# Patient Record
Sex: FEMALE | Race: WHITE | NOT HISPANIC OR LATINO | Employment: OTHER | URBAN - METROPOLITAN AREA
[De-identification: names, ages, dates, MRNs, and addresses within clinical notes are randomized per-mention and may not be internally consistent; named-entity substitution may affect disease eponyms.]

---

## 2022-11-05 ENCOUNTER — OFFICE VISIT (OUTPATIENT)
Dept: URGENT CARE | Facility: CLINIC | Age: 67
End: 2022-11-05
Payer: MEDICARE

## 2022-11-05 VITALS
BODY MASS INDEX: 36.1 KG/M2 | OXYGEN SATURATION: 98 % | SYSTOLIC BLOOD PRESSURE: 155 MMHG | RESPIRATION RATE: 20 BRPM | HEART RATE: 63 BPM | TEMPERATURE: 97 F | WEIGHT: 230 LBS | DIASTOLIC BLOOD PRESSURE: 79 MMHG | HEIGHT: 67 IN

## 2022-11-05 DIAGNOSIS — K14.4 ATROPHIC GLOSSITIS: Primary | ICD-10-CM

## 2022-11-05 PROCEDURE — 99203 OFFICE O/P NEW LOW 30 MIN: CPT | Mod: S$GLB,,, | Performed by: NURSE PRACTITIONER

## 2022-11-05 PROCEDURE — 1159F PR MEDICATION LIST DOCUMENTED IN MEDICAL RECORD: ICD-10-PCS | Mod: CPTII,S$GLB,, | Performed by: NURSE PRACTITIONER

## 2022-11-05 PROCEDURE — 1159F MED LIST DOCD IN RCRD: CPT | Mod: CPTII,S$GLB,, | Performed by: NURSE PRACTITIONER

## 2022-11-05 PROCEDURE — 1160F PR REVIEW ALL MEDS BY PRESCRIBER/CLIN PHARMACIST DOCUMENTED: ICD-10-PCS | Mod: CPTII,S$GLB,, | Performed by: NURSE PRACTITIONER

## 2022-11-05 PROCEDURE — 3077F SYST BP >= 140 MM HG: CPT | Mod: CPTII,S$GLB,, | Performed by: NURSE PRACTITIONER

## 2022-11-05 PROCEDURE — 3077F PR MOST RECENT SYSTOLIC BLOOD PRESSURE >= 140 MM HG: ICD-10-PCS | Mod: CPTII,S$GLB,, | Performed by: NURSE PRACTITIONER

## 2022-11-05 PROCEDURE — 3078F DIAST BP <80 MM HG: CPT | Mod: CPTII,S$GLB,, | Performed by: NURSE PRACTITIONER

## 2022-11-05 PROCEDURE — 3008F BODY MASS INDEX DOCD: CPT | Mod: CPTII,S$GLB,, | Performed by: NURSE PRACTITIONER

## 2022-11-05 PROCEDURE — 99203 PR OFFICE/OUTPT VISIT, NEW, LEVL III, 30-44 MIN: ICD-10-PCS | Mod: S$GLB,,, | Performed by: NURSE PRACTITIONER

## 2022-11-05 PROCEDURE — 3078F PR MOST RECENT DIASTOLIC BLOOD PRESSURE < 80 MM HG: ICD-10-PCS | Mod: CPTII,S$GLB,, | Performed by: NURSE PRACTITIONER

## 2022-11-05 PROCEDURE — 1160F RVW MEDS BY RX/DR IN RCRD: CPT | Mod: CPTII,S$GLB,, | Performed by: NURSE PRACTITIONER

## 2022-11-05 PROCEDURE — 3008F PR BODY MASS INDEX (BMI) DOCUMENTED: ICD-10-PCS | Mod: CPTII,S$GLB,, | Performed by: NURSE PRACTITIONER

## 2022-11-05 RX ORDER — FLUCONAZOLE 150 MG/1
TABLET ORAL
Qty: 3 TABLET | Refills: 0 | Status: SHIPPED | OUTPATIENT
Start: 2022-11-05 | End: 2022-12-22

## 2022-11-05 RX ORDER — METHYLPREDNISOLONE 4 MG/1
TABLET ORAL
COMMUNITY
Start: 2022-10-26 | End: 2022-12-22

## 2022-11-05 RX ORDER — NYSTATIN 100000 [USP'U]/ML
6 SUSPENSION ORAL
Qty: 240 ML | Refills: 0 | Status: SHIPPED | OUTPATIENT
Start: 2022-11-05 | End: 2022-11-15

## 2022-11-05 NOTE — PROGRESS NOTES
"Subjective:       Patient ID: Marylou Bender is a 67 y.o. female.    Vitals:  height is 5' 6.5" (1.689 m) and weight is 104.3 kg (230 lb). Her temperature is 97.2 °F (36.2 °C). Her blood pressure is 155/79 (abnormal) and her pulse is 63. Her respiration is 20 and oxygen saturation is 98%.     Chief Complaint: Mouth Lesions    Patient has had swelling, redness and pain of her tongue for 3-4 weeks. No medication changes. 2 weeks after tongue began hurting she did have some URI symptoms and was treated with steroids, no change in tongue symptoms.    Past Medical History:  No date: PTSD (post-traumatic stress disorder)    History reviewed. No pertinent surgical history.    History reviewed.  No pertinent family history.      Social History    Socioeconomic History      Marital status: Unknown      Current Outpatient Medications:  methylPREDNISolone (MEDROL DOSEPACK) 4 mg tablet, TAKE 6 TABLETS ON DAY 1 AS DIRECTED ON PACKAGE AND DECREASE BY 1 TAB EACH DAY FOR A TOTAL OF 6 DAYS, Disp: , Rfl:       No current facility-administered medications for this visit.      Review of patient's allergies indicates:  No Known Allergies       Constitution: Negative.   HENT:  Positive for tongue pain.    Neck: neck negative.   Respiratory: Negative.     Gastrointestinal: Negative.    Neurological: Negative.      Objective:      Physical Exam   Constitutional: She is oriented to person, place, and time.   HENT:   Head: Normocephalic and atraumatic.   Mouth/Throat: Uvula is midline. No oropharyngeal exudate, posterior oropharyngeal edema or posterior oropharyngeal erythema.       Cardiovascular: Normal rate.   Pulmonary/Chest: Effort normal. No respiratory distress.   Abdominal: Normal appearance.   Neurological: She is alert and oriented to person, place, and time.   Psychiatric: Her behavior is normal. Mood normal.       Assessment:       1. Atrophic glossitis          Plan:     See ent for further evaluation, needs to establish with PCP, " names given.    Atrophic glossitis  -     fluconazole (DIFLUCAN) 150 MG Tab; 1 pill every 3 days x 3 doses  Dispense: 3 tablet; Refill: 0  -     Ambulatory referral/consult to ENT  -     nystatin (MYCOSTATIN) 100,000 unit/mL suspension; Take 6 mLs (600,000 Units total) by mouth 4 (four) times daily with meals and nightly. for 10 days  Dispense: 240 mL; Refill: 0

## 2022-11-07 ENCOUNTER — TELEPHONE (OUTPATIENT)
Dept: FAMILY MEDICINE | Facility: CLINIC | Age: 67
End: 2022-11-07
Payer: MEDICARE

## 2022-12-22 ENCOUNTER — LAB VISIT (OUTPATIENT)
Dept: LAB | Facility: HOSPITAL | Age: 67
End: 2022-12-22
Payer: MEDICARE

## 2022-12-22 ENCOUNTER — OFFICE VISIT (OUTPATIENT)
Dept: FAMILY MEDICINE | Facility: CLINIC | Age: 67
End: 2022-12-22
Payer: MEDICARE

## 2022-12-22 DIAGNOSIS — D50.9 IRON DEFICIENCY ANEMIA, UNSPECIFIED IRON DEFICIENCY ANEMIA TYPE: ICD-10-CM

## 2022-12-22 DIAGNOSIS — Z13.220 SCREENING FOR HYPERLIPIDEMIA: ICD-10-CM

## 2022-12-22 DIAGNOSIS — Z00.00 ANNUAL PHYSICAL EXAM: ICD-10-CM

## 2022-12-22 DIAGNOSIS — K13.79 RECURRENT ORAL ULCERS: ICD-10-CM

## 2022-12-22 DIAGNOSIS — K14.0 GLOSSITIS: ICD-10-CM

## 2022-12-22 DIAGNOSIS — N18.30 STAGE 3 CHRONIC KIDNEY DISEASE, UNSPECIFIED WHETHER STAGE 3A OR 3B CKD: ICD-10-CM

## 2022-12-22 DIAGNOSIS — Z13.1 SCREENING FOR DIABETES MELLITUS: ICD-10-CM

## 2022-12-22 DIAGNOSIS — F32.5 MAJOR DEPRESSIVE DISORDER WITH SINGLE EPISODE, IN REMISSION: ICD-10-CM

## 2022-12-22 DIAGNOSIS — Z78.0 POST-MENOPAUSAL: ICD-10-CM

## 2022-12-22 DIAGNOSIS — G47.00 INSOMNIA, UNSPECIFIED TYPE: ICD-10-CM

## 2022-12-22 DIAGNOSIS — Z00.00 ANNUAL PHYSICAL EXAM: Primary | ICD-10-CM

## 2022-12-22 DIAGNOSIS — F43.10 PTSD (POST-TRAUMATIC STRESS DISORDER): ICD-10-CM

## 2022-12-22 DIAGNOSIS — E55.9 VITAMIN D DEFICIENCY: ICD-10-CM

## 2022-12-22 DIAGNOSIS — N39.3 STRESS INCONTINENCE (FEMALE) (MALE): ICD-10-CM

## 2022-12-22 DIAGNOSIS — F41.9 ANXIETY: ICD-10-CM

## 2022-12-22 DIAGNOSIS — R53.83 FATIGUE, UNSPECIFIED TYPE: ICD-10-CM

## 2022-12-22 DIAGNOSIS — D64.9 ANEMIA, UNSPECIFIED TYPE: ICD-10-CM

## 2022-12-22 LAB
25(OH)D3+25(OH)D2 SERPL-MCNC: 24 NG/ML (ref 30–96)
ALBUMIN SERPL BCP-MCNC: 4 G/DL (ref 3.5–5.2)
ALP SERPL-CCNC: 81 U/L (ref 55–135)
ALT SERPL W/O P-5'-P-CCNC: 14 U/L (ref 10–44)
ANION GAP SERPL CALC-SCNC: 9 MMOL/L (ref 8–16)
AST SERPL-CCNC: 22 U/L (ref 10–40)
BASOPHILS # BLD AUTO: 0.05 K/UL (ref 0–0.2)
BASOPHILS NFR BLD: 0.9 % (ref 0–1.9)
BILIRUB SERPL-MCNC: 0.8 MG/DL (ref 0.1–1)
BUN SERPL-MCNC: 18 MG/DL (ref 8–23)
CALCIUM SERPL-MCNC: 8.9 MG/DL (ref 8.7–10.5)
CHLORIDE SERPL-SCNC: 105 MMOL/L (ref 95–110)
CHOLEST SERPL-MCNC: 246 MG/DL (ref 120–199)
CHOLEST/HDLC SERPL: 3.8 {RATIO} (ref 2–5)
CO2 SERPL-SCNC: 24 MMOL/L (ref 23–29)
CREAT SERPL-MCNC: 1 MG/DL (ref 0.5–1.4)
DIFFERENTIAL METHOD: NORMAL
EOSINOPHIL # BLD AUTO: 0.2 K/UL (ref 0–0.5)
EOSINOPHIL NFR BLD: 2.8 % (ref 0–8)
ERYTHROCYTE [DISTWIDTH] IN BLOOD BY AUTOMATED COUNT: 14.2 % (ref 11.5–14.5)
EST. GFR  (NO RACE VARIABLE): >60 ML/MIN/1.73 M^2
ESTIMATED AVG GLUCOSE: 105 MG/DL (ref 68–131)
FERRITIN SERPL-MCNC: 60 NG/ML (ref 20–300)
FOLATE SERPL-MCNC: 17.3 NG/ML (ref 4–24)
GLUCOSE SERPL-MCNC: 97 MG/DL (ref 70–110)
HBA1C MFR BLD: 5.3 % (ref 4.5–6.2)
HCT VFR BLD AUTO: 41.8 % (ref 37–48.5)
HDLC SERPL-MCNC: 64 MG/DL (ref 40–75)
HDLC SERPL: 26 % (ref 20–50)
HGB BLD-MCNC: 13.5 G/DL (ref 12–16)
IMM GRANULOCYTES # BLD AUTO: 0.01 K/UL (ref 0–0.04)
IMM GRANULOCYTES NFR BLD AUTO: 0.2 % (ref 0–0.5)
IRON SERPL-MCNC: 56 UG/DL (ref 30–160)
LDLC SERPL CALC-MCNC: 157.2 MG/DL (ref 63–159)
LYMPHOCYTES # BLD AUTO: 1.2 K/UL (ref 1–4.8)
LYMPHOCYTES NFR BLD: 20.6 % (ref 18–48)
MCH RBC QN AUTO: 28.9 PG (ref 27–31)
MCHC RBC AUTO-ENTMCNC: 32.3 G/DL (ref 32–36)
MCV RBC AUTO: 90 FL (ref 82–98)
MONOCYTES # BLD AUTO: 0.5 K/UL (ref 0.3–1)
MONOCYTES NFR BLD: 8.9 % (ref 4–15)
NEUTROPHILS # BLD AUTO: 3.8 K/UL (ref 1.8–7.7)
NEUTROPHILS NFR BLD: 66.6 % (ref 38–73)
NONHDLC SERPL-MCNC: 182 MG/DL
NRBC BLD-RTO: 0 /100 WBC
PLATELET # BLD AUTO: 236 K/UL (ref 150–450)
PMV BLD AUTO: 9.4 FL (ref 9.2–12.9)
POTASSIUM SERPL-SCNC: 3.9 MMOL/L (ref 3.5–5.1)
PROT SERPL-MCNC: 7.5 G/DL (ref 6–8.4)
RBC # BLD AUTO: 4.67 M/UL (ref 4–5.4)
SATURATED IRON: 17 % (ref 20–50)
SODIUM SERPL-SCNC: 138 MMOL/L (ref 136–145)
TOTAL IRON BINDING CAPACITY: 339 UG/DL (ref 250–450)
TRANSFERRIN SERPL-MCNC: 242 MG/DL (ref 200–375)
TRIGL SERPL-MCNC: 124 MG/DL (ref 30–150)
TSH SERPL DL<=0.005 MIU/L-ACNC: 1.88 UIU/ML (ref 0.34–5.6)
VIT B12 SERPL-MCNC: 201 PG/ML (ref 210–950)
WBC # BLD AUTO: 5.74 K/UL (ref 3.9–12.7)

## 2022-12-22 PROCEDURE — 99397 PR PREVENTIVE VISIT,EST,65 & OVER: ICD-10-PCS | Mod: GZ,S$GLB,,

## 2022-12-22 PROCEDURE — 84443 ASSAY THYROID STIM HORMONE: CPT

## 2022-12-22 PROCEDURE — 82607 VITAMIN B-12: CPT

## 2022-12-22 PROCEDURE — 86803 HEPATITIS C AB TEST: CPT

## 2022-12-22 PROCEDURE — 82306 VITAMIN D 25 HYDROXY: CPT

## 2022-12-22 PROCEDURE — 80061 LIPID PANEL: CPT

## 2022-12-22 PROCEDURE — 82746 ASSAY OF FOLIC ACID SERUM: CPT

## 2022-12-22 PROCEDURE — 84466 ASSAY OF TRANSFERRIN: CPT

## 2022-12-22 PROCEDURE — 36415 COLL VENOUS BLD VENIPUNCTURE: CPT

## 2022-12-22 PROCEDURE — 99397 PER PM REEVAL EST PAT 65+ YR: CPT | Mod: GZ,S$GLB,,

## 2022-12-22 PROCEDURE — 82728 ASSAY OF FERRITIN: CPT

## 2022-12-22 PROCEDURE — 83036 HEMOGLOBIN GLYCOSYLATED A1C: CPT

## 2022-12-22 PROCEDURE — 87389 HIV-1 AG W/HIV-1&-2 AB AG IA: CPT

## 2022-12-22 PROCEDURE — 85025 COMPLETE CBC W/AUTO DIFF WBC: CPT

## 2022-12-22 PROCEDURE — 80053 COMPREHEN METABOLIC PANEL: CPT

## 2022-12-22 RX ORDER — TRAZODONE HYDROCHLORIDE 50 MG/1
50 TABLET ORAL NIGHTLY
Qty: 90 TABLET | Refills: 1 | Status: SHIPPED | OUTPATIENT
Start: 2022-12-22 | End: 2023-04-12 | Stop reason: SDUPTHER

## 2022-12-22 RX ORDER — SERTRALINE HYDROCHLORIDE 100 MG/1
100 TABLET, FILM COATED ORAL DAILY
COMMUNITY
End: 2022-12-22 | Stop reason: SDUPTHER

## 2022-12-22 RX ORDER — SERTRALINE HYDROCHLORIDE 100 MG/1
TABLET, FILM COATED ORAL
Qty: 90 TABLET | Refills: 1 | Status: SHIPPED | OUTPATIENT
Start: 2022-12-22 | End: 2023-04-12 | Stop reason: SDUPTHER

## 2022-12-22 RX ORDER — ALPRAZOLAM 0.5 MG/1
0.5 TABLET ORAL 3 TIMES DAILY PRN
Qty: 45 TABLET | Refills: 2 | Status: SHIPPED | OUTPATIENT
Start: 2022-12-22 | End: 2023-04-12 | Stop reason: SDUPTHER

## 2022-12-22 RX ORDER — TRAZODONE HYDROCHLORIDE 50 MG/1
50 TABLET ORAL NIGHTLY
COMMUNITY
End: 2022-12-22 | Stop reason: SDUPTHER

## 2022-12-22 RX ORDER — DOXYCYCLINE 50 MG/1
50 TABLET ORAL DAILY
COMMUNITY
End: 2023-01-12

## 2022-12-22 RX ORDER — NYSTATIN 100000 [USP'U]/ML
SUSPENSION ORAL
COMMUNITY
Start: 2022-11-26 | End: 2023-01-12

## 2022-12-22 RX ORDER — ALPRAZOLAM 0.5 MG/1
0.5 TABLET ORAL 3 TIMES DAILY PRN
COMMUNITY
End: 2022-12-22 | Stop reason: SDUPTHER

## 2022-12-22 NOTE — PROGRESS NOTES
Subjective:       Patient ID: Marylou Bender is a 67 y.o. female.    Chief Complaint: Establish Care and Mouth Lesions    New patient presents to clinic to establish care.  In October she began having ulcers in her mouth. She as been to urgent care and given nystatin, diflucan, and prednisone which have not resolved the problem. She has been avoiding acidic foods as they irritate her tongue.      PMHx  PTSD and anxiety: takes xanax approximately 3 times weekly.  Sees a counselor monthly.  She is feeling ok and denies thoughts of suicide.  Depression: takes zoloft, doing well  CKD  Stress incontinence  Diverticulitis  Fluttering heart  Insomnia: taking trazodone, sleeps well  Iron Deficiency Anemia  Vitamin D deficiency     Surgical History  Appendectomy  Bunionectomy (left)    Hysterectomy  Tonsillectomy    Allergies: NSAIDS    Family History  Mother () Colon cancer, thyroid disease, kidney disease, stroke, diabetes  Father () heart disease  Maternal Grandmother ( age 62) colon cancer     Social History    2 children  Retired     Had mammogram and colonoscopy in [facility], will request records  Due for flu and pneumonia, and tetanus vaccines.  Covid: had 2 vaccines, no boosters    Review of patient's allergies indicates:   Allergen Reactions    Nsaids (non-steroidal anti-inflammatory drug)      Social Determinants of Health     Tobacco Use: Low Risk     Smoking Tobacco Use: Never    Smokeless Tobacco Use: Never    Passive Exposure: Not on file   Alcohol Use: Not on file   Financial Resource Strain: Not on file   Food Insecurity: Not on file   Transportation Needs: Not on file   Physical Activity: Not on file   Stress: Not on file   Social Connections: Not on file   Housing Stability: Not on file   Depression: Low Risk     Last PHQ Score: 0      Past Medical History:   Diagnosis Date    CKD (chronic kidney disease) stage 3, GFR 30-59 ml/min      Diverticulitis     Fluttering heart     PTSD (post-traumatic stress disorder)     Stress incontinence (female)       Past Surgical History:   Procedure Laterality Date    APPENDECTOMY      BUNIONECTOMY Left      SECTION      HYSTERECTOMY      TONSILLECTOMY        Social History     Socioeconomic History    Marital status:     Number of children: 2         Current Outpatient Medications:     doxycycline (ADOXA) 50 MG tablet, Take 50 mg by mouth once daily., Disp: , Rfl:     nystatin (MYCOSTATIN) 100,000 unit/mL suspension, PLEASE SEE ATTACHED FOR DETAILED DIRECTIONS, Disp: , Rfl:     (Magic mouthwash) 1:1:1 diphenhydrAMINE(Benadryl) 12.5mg/5ml liq, aluminum & magnesium hydroxide-simethicone (Maalox), LIDOcaine viscous 2%, Swish and spit 5 mLs every 4 (four) hours as needed (ulcers). for mouth sores, Disp: 360 mL, Rfl: 0    ALPRAZolam (XANAX) 0.5 MG tablet, Take 1 tablet (0.5 mg total) by mouth 3 (three) times daily as needed for Anxiety., Disp: 45 tablet, Rfl: 2    sertraline (ZOLOFT) 100 MG tablet, Take 1.5 tablets by mouth daily, Disp: 90 tablet, Rfl: 1    traZODone (DESYREL) 50 MG tablet, Take 1 tablet (50 mg total) by mouth every evening., Disp: 90 tablet, Rfl: 1    Lab Results   Component Value Date    WBC 5.74 2022    HGB 13.5 2022    HCT 41.8 2022     2022    CHOL 246 (H) 2022    TRIG 124 2022    HDL 64 2022    ALT 14 2022    AST 22 2022     2022    K 3.9 2022     2022    CREATININE 1.0 2022    BUN 18 2022    CO2 24 2022    TSH 1.880 2022    HGBA1C 5.3 2022       Review of Systems   Constitutional:  Negative for fever.   HENT:  Positive for mouth sores. Negative for sore throat.    Respiratory: Negative.     Cardiovascular: Negative.    Genitourinary: Negative.    Neurological: Negative.    Psychiatric/Behavioral:  Negative for self-injury and suicidal ideas.  The patient is not nervous/anxious.      Objective:      Physical Exam  Vitals reviewed.   Constitutional:       Appearance: Normal appearance.   HENT:      Head: Normocephalic and atraumatic.      Jaw: There is normal jaw occlusion.      Right Ear: Tympanic membrane normal.      Left Ear: Tympanic membrane normal.      Nose: Nose normal.      Mouth/Throat:      Lips: Pink.      Mouth: Mucous membranes are moist.      Tongue: Lesions present.      Pharynx: Oropharynx is clear.      Comments: Fissured tongue  Ulcers noted to tip and side of tongue  Eyes:      Pupils: Pupils are equal, round, and reactive to light.   Cardiovascular:      Rate and Rhythm: Normal rate and regular rhythm.      Pulses: Normal pulses.           Radial pulses are 2+ on the right side and 2+ on the left side.        Dorsalis pedis pulses are 2+ on the right side and 2+ on the left side.      Heart sounds: Normal heart sounds, S1 normal and S2 normal.   Pulmonary:      Effort: Pulmonary effort is normal.      Breath sounds: Normal breath sounds.   Abdominal:      General: Abdomen is flat. Bowel sounds are normal.      Palpations: Abdomen is soft.      Tenderness: There is no abdominal tenderness.   Musculoskeletal:         General: Normal range of motion.      Right lower leg: No edema.      Left lower leg: No edema.   Skin:     General: Skin is warm and dry.      Capillary Refill: Capillary refill takes less than 2 seconds.   Neurological:      Mental Status: She is alert and oriented to person, place, and time.   Psychiatric:         Mood and Affect: Mood normal.         Behavior: Behavior normal.         Thought Content: Thought content normal.         Judgment: Judgment normal.       Assessment:       1. Annual physical exam    2. Fatigue, unspecified type    3. Screening for diabetes mellitus    4. Screening for hyperlipidemia    5. Vitamin D deficiency    6. Glossitis    7. Stage 3 chronic kidney disease, unspecified whether stage 3a or  3b CKD    8. Stress incontinence (female)    9. PTSD (post-traumatic stress disorder)    10. Post-menopausal    11. Iron deficiency anemia, unspecified iron deficiency anemia type    12. Anemia, unspecified type    13. Recurrent oral ulcers    14. Major depressive disorder with single episode, in remission    15. Insomnia, unspecified type    16. Anxiety          Plan:       Marylou was seen today for establish care and mouth lesions.    Diagnoses and all orders for this visit:    Annual physical exam  -     CBC Auto Differential; Future  -     Comprehensive Metabolic Panel; Future  -     HIV 1/2 Ag/Ab (4th Gen); Future  -     Hepatitis C Antibody; Future    Fatigue, unspecified type  -     TSH; Future    Screening for diabetes mellitus  -     Hemoglobin A1C; Future    Screening for hyperlipidemia  -     Lipid Panel; Future    Vitamin D deficiency  -     Vitamin D 25 hydroxy; Future    Glossitis  -     VITAMIN B12; Future  -     Ambulatory referral/consult to ENT; Future  -     (Magic mouthwash) 1:1:1 diphenhydrAMINE(Benadryl) 12.5mg/5ml liq, aluminum & magnesium hydroxide-simethicone (Maalox), LIDOcaine viscous 2%; Swish and spit 5 mLs every 4 (four) hours as needed (ulcers). for mouth sores    Stage 3 chronic kidney disease, unspecified whether stage 3a or 3b CKD    Stress incontinence (female)    PTSD (post-traumatic stress disorder)  -     ALPRAZolam (XANAX) 0.5 MG tablet; Take 1 tablet (0.5 mg total) by mouth 3 (three) times daily as needed for Anxiety.    Post-menopausal  -     DXA Bone Density Spine And Hip; Future    Iron deficiency anemia, unspecified iron deficiency anemia type    Anemia, unspecified type  -     Iron and TIBC; Future  -     Folate; Future  -     Ferritin; Future    Recurrent oral ulcers  -     Ambulatory referral/consult to ENT; Future  -     (Magic mouthwash) 1:1:1 diphenhydrAMINE(Benadryl) 12.5mg/5ml liq, aluminum & magnesium hydroxide-simethicone (Maalox), LIDOcaine viscous 2%; Swish and  spit 5 mLs every 4 (four) hours as needed (ulcers). for mouth sores    Major depressive disorder with single episode, in remission  -     sertraline (ZOLOFT) 100 MG tablet; Take 1.5 tablets by mouth daily    Insomnia, unspecified type  -     traZODone (DESYREL) 50 MG tablet; Take 1 tablet (50 mg total) by mouth every evening.    Anxiety  -     ALPRAZolam (XANAX) 0.5 MG tablet; Take 1 tablet (0.5 mg total) by mouth 3 (three) times daily as needed for Anxiety.     Have fasting labs as ordered   Depression and anxiety: controlled, continue current medications  Insomnia:continue trazodone  Bone density scan ordered  Referral to ENT for recurrent recurrent aphthous ulcers.  Magic mouth wash given for discomfort  Will request results for mammogram and colonoscopy   Follow up in 3 months or sooner if needed.

## 2022-12-23 ENCOUNTER — TELEPHONE (OUTPATIENT)
Dept: FAMILY MEDICINE | Facility: CLINIC | Age: 67
End: 2022-12-23
Payer: MEDICARE

## 2022-12-23 VITALS
OXYGEN SATURATION: 97 % | HEART RATE: 65 BPM | HEIGHT: 67 IN | RESPIRATION RATE: 18 BRPM | BODY MASS INDEX: 36.57 KG/M2 | SYSTOLIC BLOOD PRESSURE: 138 MMHG | DIASTOLIC BLOOD PRESSURE: 86 MMHG | TEMPERATURE: 97 F

## 2022-12-23 LAB — HIV 1+2 AB+HIV1 P24 AG SERPL QL IA: NON REACTIVE

## 2022-12-23 NOTE — TELEPHONE ENCOUNTER
----- Message from Bj Grimm III, MD sent at 12/22/2022  9:00 PM CST -----  Cholesterol is elevated vitamin-D and B12 are both low.  ABNORMAL followup to discuss further action.

## 2022-12-23 NOTE — PROGRESS NOTES
Patient called and advised Vitamin D level is low.  Recommend taking 2000 IU by mouth daily.  Can get this over the counter.  B12 is also low.  Recommend 5000 mcg of sublingual B12 daily.  You can get this over the counter also.  Saturated iron is slightly low.  You can take a multivitamin with iron.  We will recheck your labs in 3 months.

## 2022-12-24 LAB — HCV AB S/CO SERPL IA: 0.1 S/CO RATIO (ref 0–0.9)

## 2022-12-27 ENCOUNTER — TELEPHONE (OUTPATIENT)
Dept: FAMILY MEDICINE | Facility: CLINIC | Age: 67
End: 2022-12-27
Payer: MEDICARE

## 2022-12-27 NOTE — TELEPHONE ENCOUNTER
----- Message from Thalia Osman sent at 12/27/2022  9:07 AM CST -----  Regarding: Unable to Contact - ###  We have made several attempts to contact this patient to schedule their DXA Bone Density Spine And Hip and have been unable to reach them. We will be removing this order from our work queue but wanted to make you aware in case you wanted to reach out to the patient.     Thanks,   [facility] Centralized Scheduling

## 2023-01-12 ENCOUNTER — OFFICE VISIT (OUTPATIENT)
Dept: FAMILY MEDICINE | Facility: CLINIC | Age: 68
End: 2023-01-12
Payer: MEDICARE

## 2023-01-12 VITALS
SYSTOLIC BLOOD PRESSURE: 132 MMHG | OXYGEN SATURATION: 98 % | HEART RATE: 76 BPM | WEIGHT: 240 LBS | TEMPERATURE: 97 F | HEIGHT: 67 IN | DIASTOLIC BLOOD PRESSURE: 84 MMHG | BODY MASS INDEX: 37.67 KG/M2

## 2023-01-12 DIAGNOSIS — E78.5 HYPERLIPIDEMIA, UNSPECIFIED HYPERLIPIDEMIA TYPE: Primary | ICD-10-CM

## 2023-01-12 DIAGNOSIS — D13.91 FAMILIAL POLYPOSIS: ICD-10-CM

## 2023-01-12 DIAGNOSIS — F41.9 ANXIETY: ICD-10-CM

## 2023-01-12 DIAGNOSIS — F43.10 PTSD (POST-TRAUMATIC STRESS DISORDER): ICD-10-CM

## 2023-01-12 DIAGNOSIS — E55.9 VITAMIN D DEFICIENCY: ICD-10-CM
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
## 2023-01-12 DIAGNOSIS — M25.561 ARTHRALGIA OF RIGHT KNEE: ICD-10-CM

## 2023-01-12 DIAGNOSIS — E53.8 B12 DEFICIENCY: ICD-10-CM

## 2023-01-12 DIAGNOSIS — K13.70 MOUTH LESION: ICD-10-CM

## 2023-01-12 PROCEDURE — 99213 OFFICE O/P EST LOW 20 MIN: CPT | Mod: S$GLB,,, | Performed by: FAMILY MEDICINE

## 2023-01-12 PROCEDURE — 99213 PR OFFICE/OUTPT VISIT, EST, LEVL III, 20-29 MIN: ICD-10-PCS | Mod: S$GLB,,, | Performed by: FAMILY MEDICINE

## 2023-01-12 RX ORDER — CYANOCOBALAMIN 1000 UG/ML
INJECTION, SOLUTION INTRAMUSCULAR; SUBCUTANEOUS
Qty: 1 ML | Refills: 2 | Status: SHIPPED | OUTPATIENT
Start: 2023-01-12 | End: 2023-01-12

## 2023-01-12 RX ORDER — CYANOCOBALAMIN 1000 UG/ML
1000 INJECTION, SOLUTION INTRAMUSCULAR; SUBCUTANEOUS
COMMUNITY
End: 2023-01-12 | Stop reason: SDUPTHER

## 2023-01-15 NOTE — PROGRESS NOTES
Subjective:       Patient ID: Marylou Bender is a 67 y.o. female.    Chief Complaint: Follow-up    Oral lesions.  Vitamin B12 is low at 201.  Could be related.  She does have familial polyposis.  Scope is due soon.  Also PTSD and anxiety.  Hyperlipidemia cholesterol 246 with HDL of 64 and LDL of 157 she has prescription for the cholesterol but has never started.  Her A1c is normal at 5.3 CMP and CBC are both normal.  Vitamin-D is also deficient at 25.  She also had a fall years ago with F AC joint separation and right knee pain.  Degenerative disease in the knee now.  Using Tylenol p.r.n..    Physical examination.  Vital signs noted.  Mouth there are some irritated areas around the tongue.  Roof of mouth.  Chest clear.  Heart regular rate rhythm.  Extremities without edema.            Objective:        Assessment:       1. Hyperlipidemia, unspecified hyperlipidemia type    2. Arthralgia of right knee    3. Vitamin D deficiency    4. PTSD (post-traumatic stress disorder)    5. Anxiety    6. Mouth lesion    7. Fall, initial encounter    8. Familial polyposis    9. B12 deficiency          Plan:       Hyperlipidemia, unspecified hyperlipidemia type    Arthralgia of right knee  -     Ambulatory referral/consult to Orthopedics; Future; Expected date: 01/19/2023    Vitamin D deficiency    PTSD (post-traumatic stress disorder)    Anxiety    Mouth lesion    Fall, initial encounter    Familial polyposis    B12 deficiency    Other orders  -     Discontinue: cyanocobalamin 1,000 mcg/mL injection; Inject 1 ml (1,000) total Im weekly x 4 weeks. Then inject 1 ml (1,000) total IM monthly  Dispense: 1 mL; Refill: 2      New COVID vaccine recommended.  Document her flu shot which is current.  B12 1 cc IM weekly x4 then monthly.  Prescription for 4 mL in refills.  She would prefer the shots to the sublingual.  Get her documentation of vaccines from [employer].  Get her DEXA documentation from [employer].  Refer her to orthopedics regarding the  knee for possible injection.  If her tongue does not clear up with the B12 shots then to Dermatology.

## 2023-01-25 DIAGNOSIS — Z12.31 OTHER SCREENING MAMMOGRAM: ICD-10-CM

## 2023-01-31 ENCOUNTER — PATIENT MESSAGE (OUTPATIENT)
Dept: ADMINISTRATIVE | Facility: HOSPITAL | Age: 68
End: 2023-01-31
Payer: MEDICARE

## 2023-01-31 RX ORDER — CYANOCOBALAMIN 1000 UG/ML
INJECTION, SOLUTION INTRAMUSCULAR; SUBCUTANEOUS
Qty: 4 ML | Refills: 2 | OUTPATIENT
Start: 2023-01-31

## 2023-01-31 NOTE — TELEPHONE ENCOUNTER
----- Message from Isaías Coughlin sent at 1/31/2023  9:24 AM CST -----  Type: Needs Medical Advice  Who Called:  Pt  Symptoms (please be specific):  wrong RX  How long has patient had these symptoms:     Pharmacy name and phone #:    [id number] [place] - [address]  [address] [place] [address]  Phone: [phone] Fax: [phone]      Best Call Back Number: [phone]     Additional Information: Pt sts that when [facility] to [facility]  her cyanocobalamin 1,000 mcg/mL injection they only gave her 3 vials but she was supposed to get 4 as per the after visit summary and states the pharm tried to send a message to the office.  Sts she needs to get it ordered before it runs out and wants to know why only 3 were ordered.      Also wants to know if the RX was put in for after the 4 weeks for the 1 every month.  Please advise -- thank you

## 2023-02-01 ENCOUNTER — TELEPHONE (OUTPATIENT)
Dept: FAMILY MEDICINE | Facility: CLINIC | Age: 68
End: 2023-02-01
Payer: MEDICARE

## 2023-02-02 ENCOUNTER — PATIENT MESSAGE (OUTPATIENT)
Dept: ADMINISTRATIVE | Facility: HOSPITAL | Age: 68
End: 2023-02-02
Payer: MEDICARE

## 2023-02-16 ENCOUNTER — OFFICE VISIT (OUTPATIENT)
Dept: ORTHOPEDICS | Facility: CLINIC | Age: 68
End: 2023-02-16
Payer: MEDICARE

## 2023-02-16 VITALS
SYSTOLIC BLOOD PRESSURE: 142 MMHG | HEIGHT: 67 IN | WEIGHT: 240 LBS | BODY MASS INDEX: 37.67 KG/M2 | DIASTOLIC BLOOD PRESSURE: 84 MMHG

## 2023-02-16 DIAGNOSIS — M23.203 DEGENERATIVE TEAR OF MEDIAL MENISCUS OF RIGHT KNEE: ICD-10-CM

## 2023-02-16 DIAGNOSIS — M75.102 TEAR OF LEFT ROTATOR CUFF, UNSPECIFIED TEAR EXTENT, UNSPECIFIED WHETHER TRAUMATIC: ICD-10-CM

## 2023-02-16 DIAGNOSIS — M75.42 IMPINGEMENT SYNDROME OF SHOULDER, LEFT: Primary | ICD-10-CM

## 2023-02-16 DIAGNOSIS — M76.822 POSTERIOR TIBIAL TENDON DYSFUNCTION, LEFT: ICD-10-CM

## 2023-02-16 PROCEDURE — 99204 PR OFFICE/OUTPT VISIT, NEW, LEVL IV, 45-59 MIN: ICD-10-PCS | Mod: 25,S$GLB,, | Performed by: ORTHOPAEDIC SURGERY

## 2023-02-16 PROCEDURE — 99204 OFFICE O/P NEW MOD 45 MIN: CPT | Mod: 25,S$GLB,, | Performed by: ORTHOPAEDIC SURGERY

## 2023-02-16 PROCEDURE — 20610 DRAIN/INJ JOINT/BURSA W/O US: CPT | Mod: 50,S$GLB,, | Performed by: ORTHOPAEDIC SURGERY

## 2023-02-16 PROCEDURE — 20610 LARGE JOINT ASPIRATION/INJECTION: R KNEE: ICD-10-PCS | Mod: 50,S$GLB,, | Performed by: ORTHOPAEDIC SURGERY

## 2023-02-16 RX ORDER — TRIAMCINOLONE ACETONIDE 40 MG/ML
40 INJECTION, SUSPENSION INTRA-ARTICULAR; INTRAMUSCULAR
Status: DISCONTINUED | OUTPATIENT
Start: 2023-02-16 | End: 2023-02-16 | Stop reason: HOSPADM

## 2023-02-16 RX ADMIN — TRIAMCINOLONE ACETONIDE 40 MG: 40 INJECTION, SUSPENSION INTRA-ARTICULAR; INTRAMUSCULAR at 10:02

## 2023-02-16 NOTE — PROGRESS NOTES
[facility]    Subjective:     Chief Complaint:   Chief Complaint   Patient presents with    Right Knee - Pain     Right knee fell over a year ago, c/o pain, not sure of swelling, popping, gives way    Left Shoulder - Pain     Left shoulder fell over a year ago, ROM is good with lift arm, needs assistance to lower arm, painful ROM with shoulder, able to reach back    Left Ankle - Pain     Left ankle x 6 months ago, hurts to walk on ankle, pain is at medial aspect of ankle, swelling with ankle,        Past Medical History:   Diagnosis Date    CKD (chronic kidney disease) stage 3, GFR 30-59 ml/min     Diverticulitis     Fluttering heart     PTSD (post-traumatic stress disorder)     Stress incontinence (female)        Past Surgical History:   Procedure Laterality Date    APPENDECTOMY      BUNIONECTOMY Left      SECTION      HYSTERECTOMY      TONSILLECTOMY         Current Outpatient Medications   Medication Sig    (Magic mouthwash) 1:1:1 diphenhydrAMINE(Benadryl) 12.5mg/5ml liq, aluminum & magnesium hydroxide-simethicone (Maalox), LIDOcaine viscous 2% Swish and spit 5 mLs every 4 (four) hours as needed (ulcers). for mouth sores    ALPRAZolam (XANAX) 0.5 MG tablet Take 1 tablet (0.5 mg total) by mouth 3 (three) times daily as needed for Anxiety.    cyanocobalamin 1,000 mcg/mL injection INJECT 1 ML (1,000) TOTAL IM WEEKLY X 4 WEEKS. THEN INJECT 1 ML (1,000) TOTAL IM MONTHLY    sertraline (ZOLOFT) 100 MG tablet Take 1.5 tablets by mouth daily    traZODone (DESYREL) 50 MG tablet Take 1 tablet (50 mg total) by mouth every evening.     No current facility-administered medications for this visit.       Review of patient's allergies indicates:   Allergen Reactions    Nsaids (non-steroidal anti-inflammatory drug)        Family History   Problem Relation Age of Onset    Cancer Mother         colon    Thyroid disease Mother     Kidney disease Mother     Heart disease Mother     Stroke Mother     Diabetes Mother      Heart disease Father     Thyroid disease Sister     Cancer Maternal Grandmother 62        Colon       Social History     Socioeconomic History    Marital status:     Number of children: 2   Occupational History    Occupation: Retired     Comment:    Tobacco Use    Smoking status: Never    Smokeless tobacco: Never   Substance and Sexual Activity    Alcohol use: Yes     Comment: rarely    Drug use: Never    Sexual activity: Yes     Partners: Male       History of present illness:  Patient comes in today for the right knee the left shoulder and the right ankle.  She had a fall about a year ago.  She is not sure exactly if that is what caused all these symptoms but certainly she thinks it started around that fall.  Denies any other trauma.  The worst is the knee.  The 2nd worst is the shoulder.      Review of Systems:    Constitution: Negative for chills, fever, and sweats.  Negative for unexplained weight loss.    HENT:  Negative for headaches and blurry vision.    Cardiovascular:Negative for chest pain or irregular heart beat. Negative for hypertension.    Respiratory:  Negative for cough and shortness of breath.    Gastrointestinal: Negative for abdominal pain, heartburn, melena, nausea, and vomitting.    Genitourinary:  Negative bladder incontinence and dysuria.    Musculoskeletal:  See HPI for details.     Neurological: Negative for numbness.    Psychiatric/Behavioral: Negative for depression.  The patient is not nervous/anxious.      Endocrine: Negative for polyuria    Hematologic/Lymphatic: Negative for bleeding problem.  Does not bruise/bleed easily.    Skin: Negative for poor would healing and rash    Objective:      Physical Examination:    Vital Signs:    Vitals:    02/16/23 1006   BP: (!) 142/84       Body mass index is 38.16 kg/m².    This a well-developed, well nourished patient in no acute distress.  They are alert and oriented and cooperative to examination.        Patient  appears to be stated age.  She has range of motion 0-130 degrees bilaterally.  She has a lot of medial joint line tenderness on the right.  Positive Addison's for pain but not a pop.  Stable needs to varus valgus anterior posterior stresses.  Full range of motion bilateral shoulders.  Positive impingement on the left.  The rotator cuff is much weaker on the left than the right.  Spurling sign is negative.  Full range of motion of her cervical spine.  She has tenderness around the medial malleolus of the left ankle.  She does have pain with the toe up maneuver.  Tender along the posterior tibial tendon.  Pertinent New Results:    XRAY Report / Interpretation:   Three views of the left ankle within normal limits.  She is noted to have retained hardware in the foot consistent with prior surgical intervention.    Three views of the right knee demonstrate mild osteoarthritis with 3 compartment involvement.    AP and lateral of the left shoulder demonstrates a type 2 acromion.  Moderate acromioclavicular arthrosis    Assessment/Plan:      Left ankle posterior tibial tendon dysfunction.  I have referred her down to podiatry down stairs to evaluate and treat that.    Left rotator cuff tear.  I have referred her for physical therapy.  I injected the left shoulder with Kenalog and lidocaine.    Right knee medial meniscal tear degenerative I injected the right knee with Kenalog and lidocaine.  She will proceed with therapy for the knee.  Will check her backx in 6 weeks for further evaluation      This note was created using Dragon voice recognition software that occasionally misinterpreted phrases or words.

## 2023-02-16 NOTE — PROCEDURES
Large Joint Aspiration/Injection: R knee    Date/Time: 2/16/2023 10:00 AM  Performed by: Claus Wisdom MD  Authorized by: Claus Wisdom MD     Consent Done?:  Yes (Verbal)  Indications:  Pain  Site marked: the procedure site was marked    Timeout: prior to procedure the correct patient, procedure, and site was verified    Prep: patient was prepped and draped in usual sterile fashion      Local anesthesia used?: Yes    Local anesthetic:  Lidocaine 1% without epinephrine  Ultrasonic Guidance for needle placement?: No    Location:  Knee  Site:  R knee  Medications:  40 mg triamcinolone acetonide 40 mg/mL  Patient tolerance:  Patient tolerated the procedure well with no immediate complications  Large Joint Aspiration/Injection: L subacromial bursa    Date/Time: 2/16/2023 10:00 AM  Performed by: Claus Wisdom MD  Authorized by: Claus Wisdom MD     Consent Done?:  Yes (Verbal)  Indications:  Pain  Site marked: the procedure site was marked    Timeout: prior to procedure the correct patient, procedure, and site was verified    Prep: patient was prepped and draped in usual sterile fashion      Local anesthesia used?: Yes    Local anesthetic:  Lidocaine 1% without epinephrine  Ultrasonic Guidance for needle placement?: No    Location:  Shoulder  Site:  L subacromial bursa  Medications:  40 mg triamcinolone acetonide 40 mg/mL  Patient tolerance:  Patient tolerated the procedure well with no immediate complications

## 2023-02-17 ENCOUNTER — HOSPITAL ENCOUNTER (EMERGENCY)
Facility: HOSPITAL | Age: 68
Discharge: HOME | End: 2023-02-17
Attending: EMERGENCY MEDICINE
Payer: MEDICARE

## 2023-02-17 VITALS
HEART RATE: 64 BPM | WEIGHT: 240 LBS | BODY MASS INDEX: 38.57 KG/M2 | RESPIRATION RATE: 17 BRPM | TEMPERATURE: 97 F | OXYGEN SATURATION: 99 % | SYSTOLIC BLOOD PRESSURE: 145 MMHG | HEIGHT: 66 IN | DIASTOLIC BLOOD PRESSURE: 66 MMHG

## 2023-02-17 DIAGNOSIS — S00.83XA FACIAL CONTUSION, INITIAL ENCOUNTER: ICD-10-CM

## 2023-02-17 DIAGNOSIS — S09.90XA CLOSED HEAD INJURY, INITIAL ENCOUNTER: ICD-10-CM
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
## 2023-02-17 PROCEDURE — 87389 HIV-1 AG W/HIV-1&-2 AB AG IA: CPT | Performed by: EMERGENCY MEDICINE

## 2023-02-17 PROCEDURE — 36415 COLL VENOUS BLD VENIPUNCTURE: CPT | Performed by: EMERGENCY MEDICINE

## 2023-02-17 PROCEDURE — 99284 EMERGENCY DEPT VISIT MOD MDM: CPT | Mod: 25

## 2023-02-17 PROCEDURE — 25000003 PHARM REV CODE 250: Performed by: EMERGENCY MEDICINE

## 2023-02-17 PROCEDURE — 86803 HEPATITIS C AB TEST: CPT | Performed by: EMERGENCY MEDICINE

## 2023-02-17 RX ORDER — HYDROCODONE BITARTRATE AND ACETAMINOPHEN 5; 325 MG/1; MG/1
1 TABLET ORAL
Status: COMPLETED | OUTPATIENT
Start: 2023-02-17 | End: 2023-02-17

## 2023-02-17 RX ADMIN — HYDROCODONE BITARTRATE AND ACETAMINOPHEN 1 TABLET: 5; 325 TABLET ORAL at 06:02

## 2023-02-18 LAB
HCV AB SERPL QL IA: NORMAL
HIV 1+2 AB+HIV1 P24 AG SERPL QL IA: NORMAL

## 2023-02-18 NOTE — ED PROVIDER NOTES
Chief complaint:  Fall (Pt fell and hit head and knee on concrete. Pt has hematoma to L eye, denies loc, neck pain, pt not taking blood thinners /)      HPI:  Marylou Bender is a 67 y.o. female with hx CKD presenting with a fall with closed head injury.  Patient was walking across the street when she stumbled and fell to the ground, striking her face and head.  She denies loss of consciousness.  She denies any feeling of lightheadedness or presyncope preceding fall.  She has swelling and pain to the left supraorbital region.  She denies globe injury or eye pain.  No change in vision.  She denies memory loss preceding or following the incident.  There was no loss of consciousness.  No seizure activity.  There has been no subsequent vomiting.  There is no associated numbness or weakness.  She denies neck pain to clarify triage note.  She does have pain to the adjacent left facial region.  She is not anticoagulated or on antiplatelet agents.  She has abrasions to the left upper and lower extremity.  She denies pain with walking after the incident.  Tetanus immunization is up-to-date within the last 5 years.    ROS: As per HPI and below:  Positive for head injury.  No chest pain, abdominal pain, back pain, focal numbness or weakness.    Review of patient's allergies indicates:   Allergen Reactions    Nsaids (non-steroidal anti-inflammatory drug)        Patient's Medications   New Prescriptions    No medications on file   Previous Medications    (MAGIC MOUTHWASH) 1:1:1 DIPHENHYDRAMINE(BENADRYL) 12.5MG/5ML LIQ, ALUMINUM & MAGNESIUM HYDROXIDE-SIMETHICONE (MAALOX), LIDOCAINE VISCOUS 2%    Swish and spit 5 mLs every 4 (four) hours as needed (ulcers). for mouth sores    ALPRAZOLAM (XANAX) 0.5 MG TABLET    Take 1 tablet (0.5 mg total) by mouth 3 (three) times daily as needed for Anxiety.    CYANOCOBALAMIN 1,000 MCG/ML INJECTION    INJECT 1 ML (1,000) TOTAL IM WEEKLY X 4 WEEKS. THEN INJECT 1 ML (1,000) TOTAL IM MONTHLY     SERTRALINE (ZOLOFT) 100 MG TABLET    Take 1.5 tablets by mouth daily    TRAZODONE (DESYREL) 50 MG TABLET    Take 1 tablet (50 mg total) by mouth every evening.   Modified Medications    No medications on file   Discontinued Medications    No medications on file       PMH:  As per HPI and below:  Past Medical History:   Diagnosis Date    CKD (chronic kidney disease) stage 3, GFR 30-59 ml/min     Diverticulitis     Fluttering heart     PTSD (post-traumatic stress disorder)     Stress incontinence (female)      Past Surgical History:   Procedure Laterality Date    APPENDECTOMY      BUNIONECTOMY Left      SECTION      HYSTERECTOMY      TONSILLECTOMY         Social History     Socioeconomic History    Marital status:     Number of children: 2   Occupational History    Occupation: Retired     Comment:    Tobacco Use    Smoking status: Never    Smokeless tobacco: Never   Substance and Sexual Activity    Alcohol use: Yes     Comment: rarely    Drug use: Never    Sexual activity: Yes     Partners: Male       Family History   Problem Relation Age of Onset    Cancer Mother         colon    Thyroid disease Mother     Kidney disease Mother     Heart disease Mother     Stroke Mother     Diabetes Mother     Heart disease Father     Thyroid disease Sister     Cancer Maternal Grandmother 62        Colon       Physical Exam:    Vitals:    23 2106   BP: (!) 145/66   Pulse: 64   Resp: 17   Temp: 97.2 °F (36.2 °C)     GENERAL:  No apparent distress.  Alert.    HEENT:  Moist mucous membranes.  Normocephalic.  Left frontal contusion with hematoma present.  There is mild left supraorbital and left zygomatic tenderness to palpation.  Extraocular movements intact without restriction.  No periorbital ecchymosis.  NECK:  No swelling.  Midline trachea. No posterior midline tenderness to palpation.    CARDIOVASCULAR:  Regular rate and rhythm.  2+ radial pulses.    PULMONARY:  Lungs clear to auscultation  bilaterally.  No wheezes, rales, or rhonci.    ABDOMEN:  Non-tender and non-distended.    EXTREMITIES:  Warm and well perfused.  Brisk capillary refill.  Small left elbow abrasion as well as abrasion to the palm of the left hand into the left patella.  Patient has full active range of motion at the left elbow and is able to internally and externally rotate with the elbow flexed without pain.  There is no significant hand tenderness to palpation including no snuffbox or wrist tenderness associated.  She has full active range of motion of the left wrist without pain.  The patient can fully flex to left knee to 90°.  There is no knee tenderness to palpation or effusion.  NEUROLOGICAL:  Normal mental status.  Appropriate and conversant.  5/5 strength and sensation.  CN III through XII intact.    SKIN:  No rashes or ecchymoses.    BACK:  Atraumatic.  No vertebral tenderness to palpation.      Labs Reviewed   HIV 1 / 2 ANTIBODY   HEPATITIS C ANTIBODY       Current Discharge Medication List        CONTINUE these medications which have NOT CHANGED    Details   (Magic mouthwash) 1:1:1 diphenhydrAMINE(Benadryl) 12.5mg/5ml liq, aluminum & magnesium hydroxide-simethicone (Maalox), LIDOcaine viscous 2% Swish and spit 5 mLs every 4 (four) hours as needed (ulcers). for mouth sores  Qty: 360 mL, Refills: 0    Associated Diagnoses: Glossitis; Recurrent oral ulcers      ALPRAZolam (XANAX) 0.5 MG tablet Take 1 tablet (0.5 mg total) by mouth 3 (three) times daily as needed for Anxiety.  Qty: 45 tablet, Refills: 2    Associated Diagnoses: Anxiety; PTSD (post-traumatic stress disorder)      cyanocobalamin 1,000 mcg/mL injection INJECT 1 ML (1,000) TOTAL IM WEEKLY X 4 WEEKS. THEN INJECT 1 ML (1,000) TOTAL IM MONTHLY  Qty: 3 mL, Refills: 2      sertraline (ZOLOFT) 100 MG tablet Take 1.5 tablets by mouth daily  Qty: 90 tablet, Refills: 1    Associated Diagnoses: Major depressive disorder with single episode, in remission      traZODone  (DESYREL) 50 MG tablet Take 1 tablet (50 mg total) by mouth every evening.  Qty: 90 tablet, Refills: 1    Associated Diagnoses: Insomnia, unspecified type             Orders Placed This Encounter   Procedures    CT Head Without Contrast    CT Maxillofacial Without Contrast    HIV 1/2 Ag/Ab (4th Gen)    Hepatitis C Antibody       Imaging Results              CT Head Without Contrast (Final result)  Result time 02/17/23 20:56:37      Final result by Jessi Nelson MD (02/17/23 20:56:37)                   Impression:      No evidence of acute intracranial hemorrhage, major arterial infarct, acute fracture involving the bony calvarium or facial bones or mass effect.    Periorbital hematoma on the left.  No retro bulbar acute abnormality.    Incidental nonacute findings are described above.      Electronically signed by: Jessi Nelson  Date:    02/17/2023  Time:    20:56               Narrative:    EXAMINATION:  CT HEAD WITHOUT CONTRAST; CT MAXILLOFACIAL WITHOUT CONTRAST    CLINICAL HISTORY:  Head trauma, minor (Age >= 65y);; Facial trauma, blunt;    TECHNIQUE:  Low dose axial images were obtained through the head and maxillofacial bones.  Coronal and sagittal reformations were also performed. Contrast was not administered.    COMPARISON:  None    FINDINGS:  There is no evidence of acute intracranial intra or extra-axial hemorrhage or hematoma.  The gray-white matter junction differentiation is intact with no evidence of acute major arterial infarct.  There is no mass or mass effect.  The ventricles, cisterns and sulci are prominent consistent with senescent atrophic changes.    Posterior fossa structures and pituitary gland are unremarkable.    There is a left periorbital hematoma with some preseptal soft tissue swelling.  There is no evidence of retro bulbar hematoma.  The globes appear intact.  Left orbital rim is intact with no underlying fracture.  There are no facial fractures and there are no superficial  scalp soft tissue radiopaque foreign bodies.  The paranasal sinuses, mastoid air cells and middle ears bilaterally are clear.  Incidental note of hyperostosis frontalis interna.                                       CT Maxillofacial Without Contrast (Final result)  Result time 02/17/23 20:56:37      Final result by Jessi Nelson MD (02/17/23 20:56:37)                   Impression:      No evidence of acute intracranial hemorrhage, major arterial infarct, acute fracture involving the bony calvarium or facial bones or mass effect.    Periorbital hematoma on the left.  No retro bulbar acute abnormality.    Incidental nonacute findings are described above.      Electronically signed by: Jessi Nelson  Date:    02/17/2023  Time:    20:56               Narrative:    EXAMINATION:  CT HEAD WITHOUT CONTRAST; CT MAXILLOFACIAL WITHOUT CONTRAST    CLINICAL HISTORY:  Head trauma, minor (Age >= 65y);; Facial trauma, blunt;    TECHNIQUE:  Low dose axial images were obtained through the head and maxillofacial bones.  Coronal and sagittal reformations were also performed. Contrast was not administered.    COMPARISON:  None    FINDINGS:  There is no evidence of acute intracranial intra or extra-axial hemorrhage or hematoma.  The gray-white matter junction differentiation is intact with no evidence of acute major arterial infarct.  There is no mass or mass effect.  The ventricles, cisterns and sulci are prominent consistent with senescent atrophic changes.    Posterior fossa structures and pituitary gland are unremarkable.    There is a left periorbital hematoma with some preseptal soft tissue swelling.  There is no evidence of retro bulbar hematoma.  The globes appear intact.  Left orbital rim is intact with no underlying fracture.  There are no facial fractures and there are no superficial scalp soft tissue radiopaque foreign bodies.  The paranasal sinuses, mastoid air cells and middle ears bilaterally are clear.  Incidental  note of hyperostosis frontalis interna.                                    (Rad read)    The patient was informed of the incidental finding(s) as well as the need for PCP or specialist follow-up for reevaluation and possible further investigation or monitoring.         MDM:    67 y.o. female with ground level fall with closed head injury.  CT maxillofacial done to exclude facial fracture with CT head done to exclude intracranial hemorrhage with low suspicion.  Mental status is normal with nonfocal neurological exam.  She is multiple abrasions without significant tenderness or concerning limitation of range of motion.  I doubt fracture or dislocation.  I do not think further x-ray is indicated.  There is no midline vertebral cervical tenderness and I do not think cervical imaging is indicated per nexus criteria.  I doubt fracture or subluxation.  There is no sign of associated medical illness precipitating fall requiring separate workup or treatment.  Norco given here for pain.  Imaging without sign of intracranial hemorrhage or facial fracture.  She is appropriate for outpatient PCP follow-up.  Symptomatic treatment as necessary for symptoms including headache discussed.  Detailed return precautions reviewed.    Diagnoses:    1. Closed head injury  2. Facial contusion  3. Extremity abrasions       He Tovar MD  02/17/23 [id number]

## 2023-02-18 NOTE — ED NOTES
Patient to room 5.  States was [place], tripped and fell striking left temple/eyebrown on concrete.  Large hematoma present.  Denies LOC.

## 2023-02-27 ENCOUNTER — OFFICE VISIT (OUTPATIENT)
Dept: FAMILY MEDICINE | Facility: CLINIC | Age: 68
End: 2023-02-27
Payer: MEDICARE

## 2023-02-27 ENCOUNTER — PATIENT MESSAGE (OUTPATIENT)
Dept: ADMINISTRATIVE | Facility: HOSPITAL | Age: 68
End: 2023-02-27
Payer: MEDICARE

## 2023-02-27 VITALS
SYSTOLIC BLOOD PRESSURE: 136 MMHG | TEMPERATURE: 98 F | DIASTOLIC BLOOD PRESSURE: 74 MMHG | HEIGHT: 66 IN | BODY MASS INDEX: 38.57 KG/M2 | WEIGHT: 240 LBS | OXYGEN SATURATION: 97 % | HEART RATE: 65 BPM

## 2023-02-27 DIAGNOSIS — R20.8 BURNING SENSATION OF MOUTH: ICD-10-CM

## 2023-02-27 DIAGNOSIS — S00.83XD CONTUSION OF FACE, SUBSEQUENT ENCOUNTER: ICD-10-CM

## 2023-02-27 DIAGNOSIS — N18.30 STAGE 3 CHRONIC KIDNEY DISEASE, UNSPECIFIED WHETHER STAGE 3A OR 3B CKD: ICD-10-CM

## 2023-02-27 DIAGNOSIS — E53.8 B12 DEFICIENCY: ICD-10-CM

## 2023-02-27 DIAGNOSIS — H43.393 FLOATERS IN VISUAL FIELD, BILATERAL: ICD-10-CM

## 2023-02-27 DIAGNOSIS — T07.XXXA MULTIPLE ABRASIONS: ICD-10-CM

## 2023-02-27 DIAGNOSIS — S09.90XD CLOSED HEAD INJURY, SUBSEQUENT ENCOUNTER: Primary | ICD-10-CM

## 2023-02-27 PROCEDURE — 99214 OFFICE O/P EST MOD 30 MIN: CPT | Mod: S$GLB,,,

## 2023-02-27 PROCEDURE — 99214 PR OFFICE/OUTPT VISIT, EST, LEVL IV, 30-39 MIN: ICD-10-PCS | Mod: S$GLB,,,

## 2023-02-27 NOTE — PROGRESS NOTES
Subjective:       Patient ID: Marylou Bender is a 67 y.o. female.    Chief Complaint: Hospital Follow Up    Patient presents to clinic for hospital follow up for a closed head injury.  She was walking in her neighborhood with her family and stumbled and fell to ground hitting her face. She did not lose consciousness. She states her daughter helped her up and she felt some dizziness.  A CT scan was done which was negative for intracranial hemorrhage but did show periorbital hematoma on the left.  She was given [place] for pain in ER.  With imaging being negative for intracranial hemorrhage or facial fracture.  She was discharged home with instructions to follow up with PCP.  All ER records and imaging reviewed.      Today she reports headaches and occasional nausea.  Headaches are not worsening.  She has been having black floaters in both eyes. She is taking tylenol for pain which does help. She denies dizziness, one sided weakness, difficult speech, or facial droop. Past medical history of anxiety, PTSD, CKD, B12 deficiency, vitamin D Deficiency, and hyperlipidemia.     She also would like to discuss mouth irritation that has been happening for some time. She was diagnosed with B12 deficiency and started B12 injections.  She feels like her mouth irritation has not improved. She states Dr. Grimm advised if no improvement she should go to Dermatology and she would like a referral.       HPI:  Marylou Bender is a 67 y.o. female with hx CKD presenting with a fall with closed head injury.  Patient was walking across the street when she stumbled and fell to the ground, striking her face and head.  She denies loss of consciousness.  She denies any feeling of lightheadedness or presyncope preceding fall.  She has swelling and pain to the left supraorbital region.  She denies globe injury or eye pain.  No change in vision.  She denies memory loss preceding or following the incident.  There was no loss of consciousness.  No seizure  activity.  There has been no subsequent vomiting.  There is no associated numbness or weakness.  She denies neck pain to clarify triage note.  She does have pain to the adjacent left facial region.  She is not anticoagulated or on antiplatelet agents.  She has abrasions to the left upper and lower extremity.  She denies pain with walking after the incident.  Tetanus immunization is up-to-date within the last 5 years        67 y.o. female with ground level fall with closed head injury.  CT maxillofacial done to exclude facial fracture with CT head done to exclude intracranial hemorrhage with low suspicion.  Mental status is normal with nonfocal neurological exam.  She is multiple abrasions without significant tenderness or concerning limitation of range of motion.  I doubt fracture or dislocation.  I do not think further x-ray is indicated.  There is no midline vertebral cervical tenderness and I do not think cervical imaging is indicated per nexus criteria.  I doubt fracture or subluxation.  There is no sign of associated medical illness precipitating fall requiring separate workup or treatment.  Norco given here for pain.  Imaging without sign of intracranial hemorrhage or facial fracture.  She is appropriate for outpatient PCP follow-up.  Symptomatic treatment as necessary for symptoms including headache discussed.  Detailed return precautions reviewed.     Diagnoses:    1. Closed head injury  2. Facial contusion  3. Extremity abrasions         Narrative:      EXAMINATION:  CT HEAD WITHOUT CONTRAST; CT MAXILLOFACIAL WITHOUT CONTRAST     CLINICAL HISTORY:  Head trauma, minor (Age >= 65y);; Facial trauma, blunt;     TECHNIQUE:  Low dose axial images were obtained through the head and maxillofacial bones.  Coronal and sagittal reformations were also performed. Contrast was not administered.     COMPARISON:  None     FINDINGS:  There is no evidence of acute intracranial intra or extra-axial hemorrhage or hematoma.   The gray-white matter junction differentiation is intact with no evidence of acute major arterial infarct.  There is no mass or mass effect.  The ventricles, cisterns and sulci are prominent consistent with senescent atrophic changes.     Posterior fossa structures and pituitary gland are unremarkable.     There is a left periorbital hematoma with some preseptal soft tissue swelling.  There is no evidence of retro bulbar hematoma.  The globes appear intact.  Left orbital rim is intact with no underlying fracture.  There are no facial fractures and there are no superficial scalp soft tissue radiopaque foreign bodies.  The paranasal sinuses, mastoid air cells and middle ears bilaterally are clear.  Incidental note of hyperostosis frontalis interna.         CT Maxillofacial Without Contrast  Status: Final result    MyChart Results Release    [facility] Status: Active  Results Release    PACS Images for [employer] Viewer     Show images for CT Maxillofacial Without Contrast  CT Maxillofacial Without Contrast  Order: [id number]  Status: Final result     Visible to patient: Yes (not seen)     Next appt: 03/22/2023 at 11:20 AM in Family Medicine (Bj Grimm III, MD)     0 Result Notes  Details      Reading Physician Reading Date Result Priority  Jessi Nelson MD  [phone] 2/17/2023     Narrative & Impression  EXAMINATION:  CT HEAD WITHOUT CONTRAST; CT MAXILLOFACIAL WITHOUT CONTRAST     TECHNIQUE:  Low dose axial images were obtained through the head and maxillofacial bones.  Coronal and sagittal reformations were also performed. Contrast was not administered.     COMPARISON:  None     FINDINGS:  There is no evidence of acute intracranial intra or extra-axial hemorrhage or hematoma.  The gray-white matter junction differentiation is intact with no evidence of acute major arterial infarct.  There is no mass or mass effect.  The ventricles, cisterns and sulci are prominent consistent with senescent atrophic  changes.     Posterior fossa structures and pituitary gland are unremarkable.     There is a left periorbital hematoma with some preseptal soft tissue swelling.  There is no evidence of retro bulbar hematoma.  The globes appear intact.  Left orbital rim is intact with no underlying fracture.  There are no facial fractures and there are no superficial scalp soft tissue radiopaque foreign bodies.  The paranasal sinuses, mastoid air cells and middle ears bilaterally are clear.  Incidental note of hyperostosis frontalis interna.     Impression:     No evidence of acute intracranial hemorrhage, major arterial infarct, acute fracture involving the bony calvarium or facial bones or mass effect.     Periorbital hematoma on the left.  No retro bulbar acute abnormality.     Incidental nonacute findings are described above.                                        Review of patient's allergies indicates:   Allergen Reactions    Nsaids (non-steroidal anti-inflammatory drug)      Social Determinants of Health     Tobacco Use: Low Risk     Smoking Tobacco Use: Never    Smokeless Tobacco Use: Never    Passive Exposure: Not on file   Alcohol Use: Not on file   Financial Resource Strain: Not on file   Food Insecurity: Not on file   Transportation Needs: Not on file   Physical Activity: Not on file   Stress: Not on file   Social Connections: Not on file   Housing Stability: Not on file   Depression: Low Risk     Last PHQ Score: 0      Past Medical History:   Diagnosis Date    CKD (chronic kidney disease) stage 3, GFR 30-59 ml/min     Diverticulitis     Fluttering heart     PTSD (post-traumatic stress disorder)     Stress incontinence (female)       Past Surgical History:   Procedure Laterality Date    APPENDECTOMY      BUNIONECTOMY Left      SECTION      HYSTERECTOMY      TONSILLECTOMY        Social History     Socioeconomic History    Marital status:     Number of children: 2         Current Outpatient Medications:      (Magic mouthwash) 1:1:1 diphenhydrAMINE(Benadryl) 12.5mg/5ml liq, aluminum & magnesium hydroxide-simethicone (Maalox), LIDOcaine viscous 2%, Swish and spit 5 mLs every 4 (four) hours as needed (ulcers). for mouth sores, Disp: 360 mL, Rfl: 0    ALPRAZolam (XANAX) 0.5 MG tablet, Take 1 tablet (0.5 mg total) by mouth 3 (three) times daily as needed for Anxiety., Disp: 45 tablet, Rfl: 2    cyanocobalamin 1,000 mcg/mL injection, INJECT 1 ML (1,000) TOTAL IM WEEKLY X 4 WEEKS. THEN INJECT 1 ML (1,000) TOTAL IM MONTHLY, Disp: 3 mL, Rfl: 2    sertraline (ZOLOFT) 100 MG tablet, Take 1.5 tablets by mouth daily, Disp: 90 tablet, Rfl: 1    traZODone (DESYREL) 50 MG tablet, Take 1 tablet (50 mg total) by mouth every evening., Disp: 90 tablet, Rfl: 1    Lab Results   Component Value Date    WBC 5.74 12/22/2022    HGB 13.5 12/22/2022    HCT 41.8 12/22/2022     12/22/2022    CHOL 246 (H) 12/22/2022    TRIG 124 12/22/2022    HDL 64 12/22/2022    ALT 14 12/22/2022    AST 22 12/22/2022     12/22/2022    K 3.9 12/22/2022     12/22/2022    CREATININE 1.0 12/22/2022    BUN 18 12/22/2022    CO2 24 12/22/2022    TSH 1.880 12/22/2022    HGBA1C 5.3 12/22/2022       Review of Systems   Constitutional:  Negative for chills and fever.   Eyes:  Positive for visual disturbance.   Respiratory:  Negative for shortness of breath.    Cardiovascular:  Negative for chest pain.   Integumentary:         Bruising around left eye   Neurological:  Positive for dizziness and headaches. Negative for syncope, facial asymmetry, speech difficulty and weakness.     Objective:      Physical Exam  HENT:      Head: Left periorbital erythema present.     Eyes:      General: Vision grossly intact. No visual field deficit.     Extraocular Movements: Extraocular movements intact.      Conjunctiva/sclera: Conjunctivae normal.      Right eye: Right conjunctiva is not injected.      Left eye: Left conjunctiva is not injected. No  hemorrhage.  Cardiovascular:      Rate and Rhythm: Normal rate and regular rhythm.      Pulses: Normal pulses.           Radial pulses are 2+ on the right side and 2+ on the left side.        Posterior tibial pulses are 2+ on the right side and 2+ on the left side.      Heart sounds: Normal heart sounds.   Pulmonary:      Effort: Pulmonary effort is normal.      Breath sounds: Normal breath sounds.   Musculoskeletal:      Left elbow: Tenderness present.      Left hand: No tenderness.      Left knee: Normal range of motion. Tenderness present.      Right lower leg: No edema.      Left lower leg: No edema.      Comments: Small healing abrasion to left elbow.  Has full ROM  Abrasion noted to left palm of hand.   Small abrasion to left patella.  Some tenderness with palpation. Does have full ROM   Neurological:      Mental Status: She is alert and oriented to person, place, and time.      GCS: GCS eye subscore is 4. GCS verbal subscore is 5. GCS motor subscore is 6.      Cranial Nerves: Cranial nerves 2-12 are intact. No facial asymmetry.      Sensory: Sensation is intact.      Motor: Motor function is intact.      Gait: Gait is intact.       Assessment:       1. Closed head injury, subsequent encounter    2. Contusion of face, subsequent encounter    3. Floaters in visual field, bilateral    4. Multiple abrasions    5. B12 deficiency    6. Burning sensation of mouth    7. Stage 3 chronic kidney disease, unspecified whether stage 3a or 3b CKD        Plan:       Marylou was seen today for hospital follow up.    Diagnoses and all orders for this visit:    Closed head injury, subsequent encounter    Contusion of face, subsequent encounter    Floaters in visual field, bilateral  -     Ambulatory referral/consult to Ophthalmology; Future    Multiple abrasions    B12 deficiency  -     VITAMIN B12; Future    Burning sensation of mouth  -     Ambulatory referral/consult to Dermatology; Future    Stage 3 chronic kidney disease,  unspecified whether stage 3a or 3b CKD  -     VITAMIN B12; Future  -     CBC Auto Differential; Future  -     Comprehensive Metabolic Panel; Future     Will repeat B12 level  Referral to Dermatology for burning and irritation of mouth.  Referral to Ophthalmology for eye exam due to floaters after recent closed head injury  Patient is neurologically intact.  She is still having some pain and can continue taking OTC Tylenol.  Follow up in 2-3 weeks.

## 2023-02-28 ENCOUNTER — TELEPHONE (OUTPATIENT)
Dept: FAMILY MEDICINE | Facility: CLINIC | Age: 68
End: 2023-02-28
Payer: MEDICARE

## 2023-02-28 NOTE — TELEPHONE ENCOUNTER
Derm and Opt/Ophthalmology referrals: Phone & portal message with information to schedule appointment.

## 2023-03-01 ENCOUNTER — LAB VISIT (OUTPATIENT)
Dept: LAB | Facility: HOSPITAL | Age: 68
End: 2023-03-01
Attending: FAMILY MEDICINE
Payer: MEDICARE

## 2023-03-01 ENCOUNTER — PATIENT OUTREACH (OUTPATIENT)
Dept: ADMINISTRATIVE | Facility: HOSPITAL | Age: 68
End: 2023-03-01
Payer: MEDICARE

## 2023-03-01 DIAGNOSIS — E53.8 B12 DEFICIENCY: ICD-10-CM

## 2023-03-01 DIAGNOSIS — N18.30 STAGE 3 CHRONIC KIDNEY DISEASE, UNSPECIFIED WHETHER STAGE 3A OR 3B CKD: ICD-10-CM

## 2023-03-01 LAB
ALBUMIN SERPL BCP-MCNC: 3.6 G/DL (ref 3.5–5.2)
ALP SERPL-CCNC: 98 U/L (ref 55–135)
ALT SERPL W/O P-5'-P-CCNC: 24 U/L (ref 10–44)
ANION GAP SERPL CALC-SCNC: 8 MMOL/L (ref 8–16)
AST SERPL-CCNC: 19 U/L (ref 10–40)
BASOPHILS # BLD AUTO: 0.04 K/UL (ref 0–0.2)
BASOPHILS NFR BLD: 0.5 % (ref 0–1.9)
BILIRUB SERPL-MCNC: 0.7 MG/DL (ref 0.1–1)
BUN SERPL-MCNC: 19 MG/DL (ref 8–23)
CALCIUM SERPL-MCNC: 9.5 MG/DL (ref 8.7–10.5)
CHLORIDE SERPL-SCNC: 102 MMOL/L (ref 95–110)
CO2 SERPL-SCNC: 28 MMOL/L (ref 23–29)
CREAT SERPL-MCNC: 1.1 MG/DL (ref 0.5–1.4)
DIFFERENTIAL METHOD: ABNORMAL
EOSINOPHIL # BLD AUTO: 0.2 K/UL (ref 0–0.5)
EOSINOPHIL NFR BLD: 2 % (ref 0–8)
ERYTHROCYTE [DISTWIDTH] IN BLOOD BY AUTOMATED COUNT: 14.1 % (ref 11.5–14.5)
EST. GFR  (NO RACE VARIABLE): 55.1 ML/MIN/1.73 M^2
GLUCOSE SERPL-MCNC: 92 MG/DL (ref 70–110)
HCT VFR BLD AUTO: 41.4 % (ref 37–48.5)
HGB BLD-MCNC: 13 G/DL (ref 12–16)
IMM GRANULOCYTES # BLD AUTO: 0.03 K/UL (ref 0–0.04)
IMM GRANULOCYTES NFR BLD AUTO: 0.4 % (ref 0–0.5)
LYMPHOCYTES # BLD AUTO: 1.6 K/UL (ref 1–4.8)
LYMPHOCYTES NFR BLD: 20.9 % (ref 18–48)
MCH RBC QN AUTO: 28.2 PG (ref 27–31)
MCHC RBC AUTO-ENTMCNC: 31.4 G/DL (ref 32–36)
MCV RBC AUTO: 90 FL (ref 82–98)
MONOCYTES # BLD AUTO: 0.5 K/UL (ref 0.3–1)
MONOCYTES NFR BLD: 6.6 % (ref 4–15)
NEUTROPHILS # BLD AUTO: 5.2 K/UL (ref 1.8–7.7)
NEUTROPHILS NFR BLD: 69.6 % (ref 38–73)
NRBC BLD-RTO: 0 /100 WBC
PLATELET # BLD AUTO: 259 K/UL (ref 150–450)
PMV BLD AUTO: 9.7 FL (ref 9.2–12.9)
POTASSIUM SERPL-SCNC: 4 MMOL/L (ref 3.5–5.1)
PROT SERPL-MCNC: 7.5 G/DL (ref 6–8.4)
RBC # BLD AUTO: 4.61 M/UL (ref 4–5.4)
SODIUM SERPL-SCNC: 138 MMOL/L (ref 136–145)
WBC # BLD AUTO: 7.48 K/UL (ref 3.9–12.7)

## 2023-03-01 PROCEDURE — 85025 COMPLETE CBC W/AUTO DIFF WBC: CPT

## 2023-03-01 PROCEDURE — 82607 VITAMIN B-12: CPT

## 2023-03-01 PROCEDURE — 36415 COLL VENOUS BLD VENIPUNCTURE: CPT | Mod: PO

## 2023-03-01 PROCEDURE — 80053 COMPREHEN METABOLIC PANEL: CPT

## 2023-03-02 ENCOUNTER — TELEPHONE (OUTPATIENT)
Dept: DERMATOLOGY | Facility: CLINIC | Age: 68
End: 2023-03-02
Payer: MEDICARE

## 2023-03-02 ENCOUNTER — TELEPHONE (OUTPATIENT)
Dept: OPTOMETRY | Facility: CLINIC | Age: 68
End: 2023-03-02
Payer: MEDICARE

## 2023-03-02 ENCOUNTER — TELEPHONE (OUTPATIENT)
Dept: FAMILY MEDICINE | Facility: CLINIC | Age: 68
End: 2023-03-02

## 2023-03-02 LAB — VIT B12 SERPL-MCNC: >2000 PG/ML (ref 210–950)

## 2023-03-02 NOTE — TELEPHONE ENCOUNTER
Derm and Opthamology referrals: spoke with patient. Both practices first available 6/2023. Due to issues: L eye floaters after fall hitting face and burning tongue since 11/2022 she asks for sooner appointments. Staff message sent to both specialities relaying information and asking for patient contact for scheduling. Portal message sent to patient confirming actions.

## 2023-03-03 ENCOUNTER — TELEPHONE (OUTPATIENT)
Dept: FAMILY MEDICINE | Facility: CLINIC | Age: 68
End: 2023-03-03
Payer: MEDICARE

## 2023-03-03 NOTE — TELEPHONE ENCOUNTER
----- Message from Melissa Jovel NP sent at 3/3/2023  6:24 AM CST -----  B12 level is high.  Discontinue B12 supplement.

## 2023-03-08 ENCOUNTER — OFFICE VISIT (OUTPATIENT)
Dept: OPHTHALMOLOGY | Facility: CLINIC | Age: 68
End: 2023-03-08
Payer: MEDICARE

## 2023-03-08 DIAGNOSIS — H25.13 AGE-RELATED NUCLEAR CATARACT OF BOTH EYES: ICD-10-CM

## 2023-03-08 DIAGNOSIS — H04.123 DRY EYE SYNDROME OF BOTH EYES: ICD-10-CM

## 2023-03-08 DIAGNOSIS — H43.813 VITREOUS DEGENERATION OF BOTH EYES: ICD-10-CM

## 2023-03-08 DIAGNOSIS — H43.813 POSTERIOR VITREOUS DETACHMENT OF BOTH EYES: Primary | ICD-10-CM

## 2023-03-08 PROCEDURE — 92134 CPTRZ OPH DX IMG PST SGM RTA: CPT | Mod: PBBFAC,PO | Performed by: OPHTHALMOLOGY

## 2023-03-08 PROCEDURE — 92201 PR OPHTHALMOSCOPY, EXT, W/RET DRAW/SCLERAL DEPR, I&R, UNI/BI: ICD-10-PCS | Mod: S$PBB,,, | Performed by: OPHTHALMOLOGY

## 2023-03-08 PROCEDURE — 92134 OCT, RETINA - OU - BOTH EYES: ICD-10-PCS | Mod: 26,S$PBB,, | Performed by: OPHTHALMOLOGY

## 2023-03-08 PROCEDURE — 92201 OPSCPY EXTND RTA DRAW UNI/BI: CPT | Mod: S$PBB,,, | Performed by: OPHTHALMOLOGY

## 2023-03-08 PROCEDURE — 99204 OFFICE O/P NEW MOD 45 MIN: CPT | Mod: S$PBB,,, | Performed by: OPHTHALMOLOGY

## 2023-03-08 PROCEDURE — 92201 OPSCPY EXTND RTA DRAW UNI/BI: CPT | Mod: PBBFAC,PO | Performed by: OPHTHALMOLOGY

## 2023-03-08 PROCEDURE — 99999 PR PBB SHADOW E&M-EST. PATIENT-LVL II: ICD-10-PCS | Mod: PBBFAC,,, | Performed by: OPHTHALMOLOGY

## 2023-03-08 PROCEDURE — 99204 PR OFFICE/OUTPT VISIT, NEW, LEVL IV, 45-59 MIN: ICD-10-PCS | Mod: S$PBB,,, | Performed by: OPHTHALMOLOGY

## 2023-03-08 PROCEDURE — 99999 PR PBB SHADOW E&M-EST. PATIENT-LVL II: CPT | Mod: PBBFAC,,, | Performed by: OPHTHALMOLOGY

## 2023-03-08 PROCEDURE — 99212 OFFICE O/P EST SF 10 MIN: CPT | Mod: PBBFAC,PO | Performed by: OPHTHALMOLOGY

## 2023-03-08 NOTE — PROGRESS NOTES
HPI    Pt states fell 2 weeks ago fell on left side of face. Seeing black   floaters and blurry vision. States eye was swollen shut the next day.   States gotten better but still seeing floaters. Curtisjoseph FOL.   Last edited by Nancy Spicer on 3/8/2023 10:52 AM.         A/P    ICD-10-CM ICD-9-CM   1. Posterior vitreous detachment of both eyes  H43.813 379.21   2. Vitreous degeneration of both eyes  H43.813 379.21   3. Age-related nuclear cataract of both eyes  H25.13 366.16   4. Dry eye syndrome of both eyes  H04.123 375.15       1. Posterior vitreous detachment of both eyes  2. Vitreous degeneration of both eyes  Here for floater check, sustained fall around [place] and hit left side of face, no asia LOC but was seen and imaged in ER    Today PVD OU, no RT/RD with  OS, few peripheral areas cobblestone, no RT/RD  Plan: Observation  Pathology of PVD, Retinal Tear, Retinal Detachment reviewed in great detail  RD precautions discussed in detail, patient expressed understanding  RTC immediately PRN (especially ANY change flashes, floaters, vision, visual field)       3. Age-related nuclear cataract of both eyes  Mild NS, NVS  Plan: Observation   Update Mrx prn    4. Dry eye syndrome of both eyes  Mild dry eye  Rec ATs prn     RTC 1 mo DFE/OCTm OU      I saw and examined the patient and reviewed in detail the findings documented. The final examination findings, image interpretations, and plan as documented in the record represent my personal judgment and conclusions.    Curtis Velazquez MD  Vitreoretinal Surgery   [facility]  
Pt A&Ox4. NSR on telemetry monitor. Temp 98.4, HR 64, /81, RR 16, SpO2 100%. Pt woke up out of sleep sweating, c/o of dull chest pain 6/10 and mild light-headedness.

## 2023-03-09 ENCOUNTER — PATIENT MESSAGE (OUTPATIENT)
Dept: FAMILY MEDICINE | Facility: CLINIC | Age: 68
End: 2023-03-09
Payer: MEDICARE

## 2023-03-10 ENCOUNTER — TELEPHONE (OUTPATIENT)
Dept: FAMILY MEDICINE | Facility: CLINIC | Age: 68
End: 2023-03-10
Payer: MEDICARE

## 2023-03-11 NOTE — TELEPHONE ENCOUNTER
Spoke with pt , its 2 little bumps not urgent she said has apt with derm 1st of April , told her vilma doesn't do I and d s but we could try another office or to urgent care if starts to hurt . She said wait for her derm.

## 2023-03-29 ENCOUNTER — HOSPITAL ENCOUNTER (OUTPATIENT)
Dept: RADIOLOGY | Facility: HOSPITAL | Age: 68
Discharge: HOME | End: 2023-03-29
Attending: FAMILY MEDICINE
Payer: MEDICARE

## 2023-03-29 VITALS — WEIGHT: 240.06 LBS | HEIGHT: 66 IN | BODY MASS INDEX: 38.58 KG/M2

## 2023-03-29 DIAGNOSIS — Z12.31 OTHER SCREENING MAMMOGRAM: ICD-10-CM

## 2023-03-29 PROCEDURE — 77067 SCR MAMMO BI INCL CAD: CPT | Mod: TC,PO

## 2023-04-11 ENCOUNTER — PATIENT MESSAGE (OUTPATIENT)
Dept: ADMINISTRATIVE | Facility: HOSPITAL | Age: 68
End: 2023-04-11
Payer: MEDICARE

## 2023-04-12 ENCOUNTER — OFFICE VISIT (OUTPATIENT)
Dept: FAMILY MEDICINE | Facility: CLINIC | Age: 68
End: 2023-04-12
Payer: MEDICARE

## 2023-04-12 ENCOUNTER — OFFICE VISIT (OUTPATIENT)
Dept: OPHTHALMOLOGY | Facility: CLINIC | Age: 68
End: 2023-04-12
Payer: MEDICARE

## 2023-04-12 VITALS
SYSTOLIC BLOOD PRESSURE: 128 MMHG | HEART RATE: 66 BPM | OXYGEN SATURATION: 97 % | TEMPERATURE: 97 F | HEIGHT: 66 IN | BODY MASS INDEX: 38.75 KG/M2 | DIASTOLIC BLOOD PRESSURE: 74 MMHG

## 2023-04-12 DIAGNOSIS — H25.13 AGE-RELATED NUCLEAR CATARACT OF BOTH EYES: ICD-10-CM

## 2023-04-12 DIAGNOSIS — L71.9 ROSACEA: Primary | ICD-10-CM

## 2023-04-12 DIAGNOSIS — H43.813 VITREOUS DEGENERATION OF BOTH EYES: ICD-10-CM

## 2023-04-12 DIAGNOSIS — E66.01 SEVERE OBESITY (BMI 35.0-39.9) WITH COMORBIDITY: ICD-10-CM

## 2023-04-12 DIAGNOSIS — F41.9 ANXIETY: ICD-10-CM

## 2023-04-12 DIAGNOSIS — F43.10 PTSD (POST-TRAUMATIC STRESS DISORDER): ICD-10-CM

## 2023-04-12 DIAGNOSIS — F32.5 MAJOR DEPRESSIVE DISORDER WITH SINGLE EPISODE, IN REMISSION: ICD-10-CM

## 2023-04-12 DIAGNOSIS — H04.123 DRY EYE SYNDROME OF BOTH EYES: ICD-10-CM

## 2023-04-12 DIAGNOSIS — H43.813 POSTERIOR VITREOUS DETACHMENT OF BOTH EYES: Primary | ICD-10-CM

## 2023-04-12 DIAGNOSIS — E53.8 VITAMIN B12 DEFICIENCY: ICD-10-CM

## 2023-04-12 DIAGNOSIS — G47.00 INSOMNIA, UNSPECIFIED TYPE: ICD-10-CM

## 2023-04-12 PROCEDURE — 99212 OFFICE O/P EST SF 10 MIN: CPT | Mod: PBBFAC,PO | Performed by: OPHTHALMOLOGY

## 2023-04-12 PROCEDURE — G0009 ADMIN PNEUMOCOCCAL VACCINE: HCPCS | Mod: S$GLB,,, | Performed by: FAMILY MEDICINE

## 2023-04-12 PROCEDURE — 92014 PR EYE EXAM, EST PATIENT,COMPREHESV: ICD-10-PCS | Mod: S$PBB,,, | Performed by: OPHTHALMOLOGY

## 2023-04-12 PROCEDURE — 92014 COMPRE OPH EXAM EST PT 1/>: CPT | Mod: S$PBB,,, | Performed by: OPHTHALMOLOGY

## 2023-04-12 PROCEDURE — 92134 CPTRZ OPH DX IMG PST SGM RTA: CPT | Mod: PBBFAC,PO | Performed by: OPHTHALMOLOGY

## 2023-04-12 PROCEDURE — G0009 PNEUMOCOCCAL CONJUGATE VACCINE 20-VALENT: ICD-10-PCS | Mod: S$GLB,,, | Performed by: FAMILY MEDICINE

## 2023-04-12 PROCEDURE — 99214 OFFICE O/P EST MOD 30 MIN: CPT | Mod: 25,S$GLB,, | Performed by: FAMILY MEDICINE

## 2023-04-12 PROCEDURE — 90677 PCV20 VACCINE IM: CPT | Mod: S$GLB,,, | Performed by: FAMILY MEDICINE

## 2023-04-12 PROCEDURE — 99999 PR PBB SHADOW E&M-EST. PATIENT-LVL II: CPT | Mod: PBBFAC,,, | Performed by: OPHTHALMOLOGY

## 2023-04-12 PROCEDURE — 99999 PR PBB SHADOW E&M-EST. PATIENT-LVL II: ICD-10-PCS | Mod: PBBFAC,,, | Performed by: OPHTHALMOLOGY

## 2023-04-12 PROCEDURE — 99214 PR OFFICE/OUTPT VISIT, EST, LEVL IV, 30-39 MIN: ICD-10-PCS | Mod: 25,S$GLB,, | Performed by: FAMILY MEDICINE

## 2023-04-12 PROCEDURE — 92134 OCT, RETINA - OU - BOTH EYES: ICD-10-PCS | Mod: 26,S$PBB,, | Performed by: OPHTHALMOLOGY

## 2023-04-12 PROCEDURE — 90677 PNEUMOCOCCAL CONJUGATE VACCINE 20-VALENT: ICD-10-PCS | Mod: S$GLB,,, | Performed by: FAMILY MEDICINE

## 2023-04-12 NOTE — PROGRESS NOTES
HPI    Pt presents for one month dfe/oct for PVD OU     Complains of floaters OS and states the eye twitches. No FOL or changes in   vision.     No ocular meds     Last edited by Eliana Sahu on 4/12/2023 10:30 AM.         A/P    ICD-10-CM ICD-9-CM   1. Posterior vitreous detachment of both eyes  H43.813 379.21       1. Posterior vitreous detachment of both eyes  2. Vitreous degeneration of both eyes  Here for floater f/u, still feels like floaters in both eyes, but not any worse since last visit    Today PVD OU, no RT/RD, few peripheral areas cobblestone, no RT/RD  Plan: Observation  Pathology of PVD, Retinal Tear, Retinal Detachment reviewed in great detail  RD precautions discussed in detail, patient expressed understanding  RTC immediately PRN (especially ANY change flashes, floaters, vision, visual field)       3. Age-related nuclear cataract of both eyes  Mild NS, NVS  Plan: Observation   Update Mrx prn    4. Dry eye syndrome of both eyes  Mild dry eye  Rec ATs prn     RTC 12 mo DFE/OCTm OU      I saw and examined the patient and reviewed in detail the findings documented. The final examination findings, image interpretations, and plan as documented in the record represent my personal judgment and conclusions.    Curtis Velazquez MD  Vitreoretinal Surgery   [facility]

## 2023-04-15 PROBLEM — L71.9 ROSACEA: Status: ACTIVE | Noted: 2023-04-15

## 2023-04-15 PROBLEM — F32.5 MAJOR DEPRESSIVE DISORDER WITH SINGLE EPISODE, IN REMISSION: Status: ACTIVE | Noted: 2023-04-15

## 2023-04-15 PROBLEM — G47.00 INSOMNIA: Status: ACTIVE | Noted: 2023-04-15

## 2023-04-15 RX ORDER — TRAZODONE HYDROCHLORIDE 50 MG/1
50 TABLET ORAL NIGHTLY
Qty: 90 TABLET | Refills: 1 | Status: SHIPPED | OUTPATIENT
Start: 2023-04-15 | End: 2023-10-17 | Stop reason: SDUPTHER

## 2023-04-15 RX ORDER — DOXYCYCLINE HYCLATE 50 MG/1
50 CAPSULE ORAL DAILY
Qty: 30 CAPSULE | Refills: 2 | Status: SHIPPED | OUTPATIENT
Start: 2023-04-15 | End: 2023-08-19

## 2023-04-15 RX ORDER — ALPRAZOLAM 0.5 MG/1
0.5 TABLET ORAL 3 TIMES DAILY PRN
Qty: 45 TABLET | Refills: 2 | Status: SHIPPED | OUTPATIENT
Start: 2023-04-15 | End: 2023-10-17 | Stop reason: SDUPTHER

## 2023-04-15 RX ORDER — SERTRALINE HYDROCHLORIDE 100 MG/1
TABLET, FILM COATED ORAL
Qty: 90 TABLET | Refills: 1 | Status: SHIPPED | OUTPATIENT
Start: 2023-04-15 | End: 2023-08-31

## 2023-04-15 NOTE — PROGRESS NOTES
Subjective:       Patient ID: Marylou Bender is a 68 y.o. female.    Chief Complaint: Follow-up    B12 deficiency replaced.  Greater than 2000 as of March 1st.  Concussion on February 17th.  Crossing road at [employer] fell hit left temple.  He would on the pavement.  Went to the emergency room.  CT of the head was negative.  Eye exam done.  No intra-ocular injury.  Left orbital pain.  Less swollen quite a bit.  Still has not there.  Daily headaches.  Using Tylenol.  GFR is 55.1 CBC is normal.  Insomnia issues also.  Needs trazodone.  Anxiety and PTSD.  On Zoloft and Xanax p.r.n. use.  Needs refills.  Rosacea problems flaring up also.  Needs doxycycline 50 mg filled.    Physical examination.  Vital signs noted.  Pupils are equal round regular active light accommodation cranial nerves intact.  Romberg negative.   symmetrical.  Neck is supple no bruit.  Chest clear.  Heart regular rate rhythm.  Extremities without edema.  Slightly tender long left temporal.  No significant swelling now.      Objective:        Assessment:       1. Rosacea    2. Anxiety    3. PTSD (post-traumatic stress disorder)    4. Insomnia, unspecified type    5. Vitamin B12 deficiency    6. Severe obesity (BMI 35.0-39.9) with comorbidity    7. Major depressive disorder with single episode, in remission        Plan:       Rosacea  -     doxycycline 50 MG capsule; Take 1 capsule (50 mg total) by mouth once daily.  Dispense: 30 capsule; Refill: 2    Anxiety  -     ALPRAZolam (XANAX) 0.5 MG tablet; Take 1 tablet (0.5 mg total) by mouth 3 (three) times daily as needed for Anxiety.  Dispense: 45 tablet; Refill: 2  -     sertraline (ZOLOFT) 100 MG tablet; Take 1.5 tablets by mouth daily  Dispense: 90 tablet; Refill: 1    PTSD (post-traumatic stress disorder)  -     ALPRAZolam (XANAX) 0.5 MG tablet; Take 1 tablet (0.5 mg total) by mouth 3 (three) times daily as needed for Anxiety.  Dispense: 45 tablet; Refill: 2    Insomnia, unspecified type  -     traZODone  (DESYREL) 50 MG tablet; Take 1 tablet (50 mg total) by mouth every evening.  Dispense: 90 tablet; Refill: 1    Vitamin B12 deficiency    Severe obesity (BMI 35.0-39.9) with comorbidity    Major depressive disorder with single episode, in remission    Other orders  -     Pneumococcal Conjugate Vaccine (20 Valent) (IM)    Prevnar 20 today.  Vibramycin 50 mg daily.  Follow-up 3 months.  Document her prior flu vaccine given here.  Refill her trazodone and her Zoloft and the Xanax 0.5.

## 2023-04-24 ENCOUNTER — PATIENT MESSAGE (OUTPATIENT)
Dept: ADMINISTRATIVE | Facility: HOSPITAL | Age: 68
End: 2023-04-24
Payer: MEDICARE

## 2023-04-27 ENCOUNTER — OFFICE VISIT (OUTPATIENT)
Dept: OTOLARYNGOLOGY | Facility: CLINIC | Age: 68
End: 2023-04-27
Payer: MEDICARE

## 2023-04-27 VITALS — WEIGHT: 240.06 LBS | HEIGHT: 66 IN | BODY MASS INDEX: 38.58 KG/M2

## 2023-04-27 DIAGNOSIS — K14.6 BURNING MOUTH SYNDROME: Primary | ICD-10-CM

## 2023-04-27 PROCEDURE — 99999 PR PBB SHADOW E&M-EST. PATIENT-LVL III: CPT | Mod: PBBFAC,,, | Performed by: PHYSICIAN ASSISTANT

## 2023-04-27 PROCEDURE — 99999 PR PBB SHADOW E&M-EST. PATIENT-LVL III: ICD-10-PCS | Mod: PBBFAC,,, | Performed by: PHYSICIAN ASSISTANT

## 2023-04-27 PROCEDURE — 99203 PR OFFICE/OUTPT VISIT, NEW, LEVL III, 30-44 MIN: ICD-10-PCS | Mod: S$PBB,,, | Performed by: PHYSICIAN ASSISTANT

## 2023-04-27 PROCEDURE — 99213 OFFICE O/P EST LOW 20 MIN: CPT | Mod: PBBFAC,PO | Performed by: PHYSICIAN ASSISTANT

## 2023-04-27 PROCEDURE — 99203 OFFICE O/P NEW LOW 30 MIN: CPT | Mod: S$PBB,,, | Performed by: PHYSICIAN ASSISTANT

## 2023-04-27 RX ORDER — AMITRIPTYLINE HYDROCHLORIDE 25 MG/1
25 TABLET, FILM COATED ORAL NIGHTLY
Qty: 90 TABLET | Refills: 1 | Status: ON HOLD | OUTPATIENT
Start: 2023-04-27 | End: 2023-12-12 | Stop reason: HOSPADM

## 2023-04-27 NOTE — PROGRESS NOTES
[facility]    Subjective:      Patient: Marylou Bender Patient PCP: Bj Grimm III, MD         :  1955     Sex:  female      MRN:  [id number]          Date of Visit: 2023      Chief Complaint: Burning tongue    Patient ID: Marylou Bender is a 68 y.o. female who presents to clinic for evaluation of burning tongue pain. Pt states that her issues with burning tongue started around 2022. Pt has been treated with antibiotics, magic mouth wash and has had multiple rounds of nystatin oral suspension with none of the above helping with her tongue pain. Pt denies fever/chills, throat pain/sore throat, dysphagia, fever/chills, drenching night sweats or unintentional weight loss. Pt denies h/o cancer. Pt states well hydrated.     Past Medical History  She has a past medical history of CKD (chronic kidney disease) stage 3, GFR 30-59 ml/min, Diverticulitis, Fluttering heart, PTSD (post-traumatic stress disorder), and Stress incontinence (female).    Family History  Her family history includes Cancer in her mother; Cancer (age of onset: 62) in her maternal grandmother; Diabetes in her mother; Heart disease in her father and mother; Kidney disease in her mother; Stroke in her mother; Thyroid disease in her mother and sister.    Past Surgical History:   Procedure Laterality Date    APPENDECTOMY      BUNIONECTOMY Left      SECTION      HYSTERECTOMY      TONSILLECTOMY       Social History     Tobacco Use    Smoking status: Never    Smokeless tobacco: Never   Substance and Sexual Activity    Alcohol use: Yes     Comment: rarely    Drug use: Never    Sexual activity: Yes     Partners: Male     Medications  She has a current medication list which includes the following prescription(s): alprazolam, cyanocobalamin, doxycycline, sertraline, trazodone, and amitriptyline.    Review of patient's allergies indicates:   Allergen Reactions    Nsaids (non-steroidal anti-inflammatory drug)      All medications,  allergies, and past history have been reviewed.    Objective:      Vitals:  Vitals - 1 value per visit 4/12/2023 4/27/2023 4/27/2023   SYSTOLIC 128 - -   DIASTOLIC 74 - -   Pulse 66 - -   Temp 97.4 - -   Resp - - -   SPO2 97 - -   Weight (lb) - - 240.08   Weight (kg) - - 108.9   Height 66 - 66   BMI (Calculated) - - 38.8   VISIT REPORT - - -   Pain Score  - 5 -       Body surface area is 2.25 meters squared.  Physical Exam  Constitutional:       General: She is not in acute distress.     Appearance: Normal appearance. She is not ill-appearing.   HENT:      Head: Normocephalic and atraumatic.      Right Ear: Tympanic membrane, ear canal and external ear normal.      Left Ear: Tympanic membrane, ear canal and external ear normal.      Nose: Nose normal.      Mouth/Throat:      Lips: Pink. No lesions.      Mouth: Mucous membranes are moist. No oral lesions.      Tongue: No lesions.      Palate: No lesions.      Pharynx: Oropharynx is clear. Uvula midline. No pharyngeal swelling, oropharyngeal exudate, posterior oropharyngeal erythema or uvula swelling.      Tonsils: 0 on the right. 0 on the left.      Comments: S/p tonsillectomy  Eyes:      General:         Right eye: No discharge.         Left eye: No discharge.      Extraocular Movements: Extraocular movements intact.      Conjunctiva/sclera: Conjunctivae normal.   Pulmonary:      Effort: Pulmonary effort is normal.   Neurological:      General: No focal deficit present.      Mental Status: She is alert and oriented to person, place, and time. Mental status is at baseline.   Psychiatric:         Mood and Affect: Mood normal.         Behavior: Behavior normal.         Thought Content: Thought content normal.         Judgment: Judgment normal.     Labs:  WBC   Date Value Ref Range Status   03/01/2023 7.48 3.90 - 12.70 K/uL Final     Eosinophil %   Date Value Ref Range Status   03/01/2023 2.0 0.0 - 8.0 % Final     Eos #   Date Value Ref Range Status   03/01/2023 0.2 0.0  - 0.5 K/uL Final     Platelets   Date Value Ref Range Status   03/01/2023 259 150 - 450 K/uL Final     Glucose   Date Value Ref Range Status   03/01/2023 92 70 - 110 mg/dL Final     All lab results, imaging results, and data have been reviewed.    Assessment:        ICD-10-CM ICD-9-CM   1. Burning mouth syndrome  K14.6 529.6            Plan:      Burning mouth syndrome  -     amitriptyline (ELAVIL) 25 MG tablet; Take 1 tablet (25 mg total) by mouth nightly.  Dispense: 90 tablet; Refill: 1       Follow up in 2 months but if out of town message me in system to see how oral symptoms are doing with taking elavil.

## 2023-04-28 ENCOUNTER — OFFICE VISIT (OUTPATIENT)
Dept: DERMATOLOGY | Facility: CLINIC | Age: 68
End: 2023-04-28
Payer: MEDICARE

## 2023-04-28 DIAGNOSIS — L81.4 SOLAR LENTIGO: ICD-10-CM

## 2023-04-28 DIAGNOSIS — R20.8 BURNING SENSATION OF MOUTH: ICD-10-CM

## 2023-04-28 DIAGNOSIS — L29.8 OTHER PRURITUS: ICD-10-CM

## 2023-04-28 DIAGNOSIS — R63.8 OTHER SYMPTOMS AND SIGNS CONCERNING FOOD AND FLUID INTAKE: ICD-10-CM

## 2023-04-28 DIAGNOSIS — K11.7 XEROSTOMIA: Primary | ICD-10-CM

## 2023-04-28 PROCEDURE — 99204 PR OFFICE/OUTPT VISIT, NEW, LEVL IV, 45-59 MIN: ICD-10-PCS | Mod: S$PBB,,, | Performed by: DERMATOLOGY

## 2023-04-28 PROCEDURE — 99999 PR PBB SHADOW E&M-EST. PATIENT-LVL III: CPT | Mod: PBBFAC,,, | Performed by: DERMATOLOGY

## 2023-04-28 PROCEDURE — 99213 OFFICE O/P EST LOW 20 MIN: CPT | Mod: PBBFAC,PO | Performed by: DERMATOLOGY

## 2023-04-28 PROCEDURE — 99999 PR PBB SHADOW E&M-EST. PATIENT-LVL III: ICD-10-PCS | Mod: PBBFAC,,, | Performed by: DERMATOLOGY

## 2023-04-28 PROCEDURE — 99204 OFFICE O/P NEW MOD 45 MIN: CPT | Mod: S$PBB,,, | Performed by: DERMATOLOGY

## 2023-04-28 NOTE — PROGRESS NOTES
Subjective:      Patient ID:  Marylou Bender is a 68 y.o. female who presents for   Chief Complaint   Patient presents with    Spot     Face     New patient     Patient here today for burning sensation in mouth and on tongue since October   States she gets ulcers on the tip of her tongue  Uses magic mouthwash but it burns the lesions  Has tried steroids, antibiotics, B12 supplements  Dr. Grimm recommended her to see derm and ENT. Saw L[facility] Stout yesterday- prescribed Elavil, has not started    Tongue tingles  No gym chewing  Has dental implants (over the last 5 years)  Denies ulcers in vulva  Started supplementing D3  Past B12 injection  Mouth feels dry  Occasional dysguesia (odd chemical taste)    On oral doxy for rosacea, had been out when this started    Intense stressor in the past 3 years, familial    Also complains of spots on her face x years      Current Outpatient Medications:   ·  ALPRAZolam (XANAX) 0.5 MG tablet, Take 1 tablet (0.5 mg total) by mouth 3 (three) times daily as needed for Anxiety., Disp: 45 tablet, Rfl: 2  ·  amitriptyline (ELAVIL) 25 MG tablet, Take 1 tablet (25 mg total) by mouth nightly., Disp: 90 tablet, Rfl: 1  ·  cyanocobalamin 1,000 mcg/mL injection, INJECT 1 ML TOTAL INTO THE MUSCLE WEEKLY X 4 WEEKS. THEN INJECT 1 ML INTO THE MUSCLE MONTHLY, Disp: 6 mL, Rfl: 1  ·  doxycycline 50 MG capsule, Take 1 capsule (50 mg total) by mouth once daily., Disp: 30 capsule, Rfl: 2  ·  sertraline (ZOLOFT) 100 MG tablet, Take 1.5 tablets by mouth daily, Disp: 90 tablet, Rfl: 1  ·  traZODone (DESYREL) 50 MG tablet, Take 1 tablet (50 mg total) by mouth every evening., Disp: 90 tablet, Rfl: 1              Review of Systems   Constitutional:  Negative for fever, chills and fatigue.   Respiratory:  Negative for cough and shortness of breath.    Gastrointestinal:  Negative for nausea, vomiting and diarrhea.     Objective:   Physical Exam   Constitutional: She appears well-developed and well-nourished.    Neurological: She is alert and oriented to person, place, and time.   Psychiatric: She has a normal mood and affect.   Skin:                  Diagram Legend     Erythematous scaling macule/papule c/w actinic keratosis       Vascular papule c/w angioma      Pigmented verrucoid papule/plaque c/w seborrheic keratosis      Yellow umbilicated papule c/w sebaceous hyperplasia      Irregularly shaped tan macule c/w lentigo     1-2 mm smooth white papules consistent with Milia      Movable subcutaneous cyst with punctum c/w epidermal inclusion cyst      Subcutaneous movable cyst c/w pilar cyst      Firm pink to brown papule c/w dermatofibroma      Pedunculated fleshy papule(s) c/w skin tag(s)      Evenly pigmented macule c/w junctional nevus     Mildly variegated pigmented, slightly irregular-bordered macule c/w mildly atypical nevus      Flesh colored to evenly pigmented papule c/w intradermal nevus       Pink pearly papule/plaque c/w basal cell carcinoma      Erythematous hyperkeratotic cursted plaque c/w SCC      Surgical scar with no sign of skin cancer recurrence      Open and closed comedones      Inflammatory papules and pustules      Verrucoid papule consistent consistent with wart     Erythematous eczematous patches and plaques     Dystrophic onycholytic nail with subungual debris c/w onychomycosis     Umbilicated papule    Erythematous-base heme-crusted tan verrucoid plaque consistent with inflamed seborrheic keratosis     Erythematous Silvery Scaling Plaque c/w Psoriasis     See annotation     Latest Reference Range & Units 12/22/22 09:09   Vit D, 25-Hydroxy 30 - 96 ng/mL 24 (L)   Vitamin B-12 210 - 950 pg/mL 201 (L)   Hemoglobin A1C External 4.5 - 6.2 % 5.3   Estimated Avg Glucose 68 - 131 mg/dL 105   TSH 0.340 - 5.600 uIU/mL 1.880   (L): Data is abnormally low        Assessment / Plan:        Xerostomia  -     BEHZAD Screen w/Reflex; Future; Expected date: 04/28/2023  -     ANTI -SSA ANTIBODY; Future; Expected  date: 04/28/2023    Burning sensation of mouth  -     Ambulatory referral/consult to Dermatology  -     IRON AND TIBC; Future; Expected date: 04/28/2023  -     FERRITIN; Future; Expected date: 04/28/2023  -     FOLATE; Future; Expected date: 04/28/2023  Fissured/scrotal tongue  Oral mucosa otherwise wnl  Reviewed [facility] St[facility] (ENT) note    No cinnamon, no gum chewing, no acidic or spicy food, no bleaching toothpaste  No GI complaint or lip changes to suggest Melkersson-Elie syndrome  B12 normal, check folate and iron    Message if ulceration develops, none noted today    Other pruritus  -     IRON AND TIBC; Future; Expected date: 04/28/2023    Other symptoms and signs concerning food and fluid intake  -     FERRITIN; Future; Expected date: 04/28/2023    Agree with trial of amitriptyline 25mg nightly  Add alpha liopoic acid 300mg BID    Solar lentigo, face  This is a benign hyperpigmented sun induced lesion. Daily sun protection will reduce the number of new lesions. Treatment of these benign lesions are considered cosmetic.          LOS NUMBER AND COMPLEXITY OF PROBLEMS    COMPLEXITY OF DATA RISK TOTAL TIME (m)   95544  53609 [] 1 self-limited or minor problem [] Minimal to none [] No treatment recommended or patient to monitor. Reassurance.  15-29  10-19   96619  07483 Low  [] 2 or more self limited or minor problems  [] 1 stable chronic illness  [] 1 acute, uncomplicated illness or injury Limited (2)  [] Prior external notes from each unique source  [] Review result of each unique test  [] Order each unique test  OR [] Independent historian Low  []  OTC medications   []  Discussed/Decision for minor skin surgery (no risk factors) 30-44 20-29   27756  84719 Moderate  []  1 or more chronic unstable illness (not at goal or progression or exacerbation) or SE of treatment  []  2 or more stable chronic illnesses  []  1 acute illness with systemic symptoms  []  1 acute complicated injury  [x]  1 undiagnosed new  problem with uncertain prognosis Moderate (1/3 below)  [x]  3 or more data items        *Now includes independent historian  []  Independent interpretation of a test  []  Discuss management/test with another provider Moderate  []  Prescription drug mgmt  []  Discussed/Decision for Minor surgery with risk factors  []  Mgmt limited by social determinates 45-59  30-39   90753  42213 High  []  1 or more chronic illness with severe exacerbation, progression or SE of treatment  []  1 acute or chronic illness/injury that poses a threat to life or bodily function Extensive (2/3 below)  []  3 or more data items        *Now includes independent historian.  []  Independent interpretation of a test  []  Discuss management/test with another provider High  []  Major surgery with risk discussed  []  Drug therapy requiring intensive monitoring for toxicity  []  Hospitalization  []  Decision for DNR 60-74  40-54              No follow-ups on file.

## 2023-05-01 ENCOUNTER — PATIENT MESSAGE (OUTPATIENT)
Dept: OTOLARYNGOLOGY | Facility: CLINIC | Age: 68
End: 2023-05-01
Payer: MEDICARE

## 2023-05-01 ENCOUNTER — TELEPHONE (OUTPATIENT)
Dept: OTOLARYNGOLOGY | Facility: CLINIC | Age: 68
End: 2023-05-01
Payer: MEDICARE

## 2023-05-01 NOTE — TELEPHONE ENCOUNTER
----- Message from Jacqueline Church sent at 5/1/2023  3:11 PM CDT -----  Regarding: pharmacy auhtorization  Pharmacy Calling to Clarify an RX Name of Caller: Lucie      Pharmacy Name:   [facility] [address] [place] [address] - [address]  [id number]  [facility] [address]  Phone: [phone] Fax: [phone]          Prescription Name: amitriptyline (ELAVIL) 25 MG tablet      What do they need to clarify?: looks like she's also on sertraline (ZOLOFT) with another provider and there can possibly be a drug interactions. Please advise.     Best Call Back Number:     Additional Information:

## 2023-05-01 NOTE — TELEPHONE ENCOUNTER
Reached out to pt to inform of provider's response, and pt advised to notify the office if she begins to experience manic symptoms, increased heart rate, or depressive symptoms. Also offered to use an alternative medications (Gabapentin) if she wishes to not proceed with amitriptyline. Pt wishes to try out Amitriptyline medication low dose for now, and will reach out if this changes. Reached out to pharmacy to proceed with filling RX.

## 2023-05-01 NOTE — TELEPHONE ENCOUNTER
Returned call and spoke with Pharmacist. Pt is taking Zoloft and was prescribed Amitriptyline, which can enhance the effectiveness of Zoloft. Okay to proceed with filling Amitriptyline? Message sent to prescribing provider. Awaiting response.

## 2023-05-01 NOTE — TELEPHONE ENCOUNTER
----- Message from Jae Stout PA-C sent at 4/28/2023 12:03 PM CDT -----  Regarding: RE: advice  Contact: Simin  Will proceed with low dose elavil. Pt stated she would contact office if having side effects. Thank you! Please let pharmacy know.  ----- Message -----  From: Zarina Lyon MA  Sent: 4/27/2023   4:36 PM CDT  To: Jae Stout PA-C  Subject: FW: advice                                         ----- Message -----  From: Yolis Mcnamara  Sent: 4/27/2023   3:27 PM CDT  To: [facility] Staff  Subject: advice                                           Type:  Pharmacy Calling to Clarify an RX    Name of Caller:  Simin with [facility]   Pharmacy Name:    Prescription Name:  amitriptyline (ELAVIL) 25 MG tablet 90 tablet 1 4/27/2023 4/26/2024   Sig - Route: Take 1 tablet (25 mg total) by mouth nightly. - Oral   Sent to pharmacy as: amitriptyline (ELAVIL) 25 MG tablet   E-Prescribing Status: Receipt confirmed by pharmacy (4/27/2023  3:09 PM CDT)       What do they need to clarify?:    Best Call Back Number:  [phone]  Additional Information:  Simin stated that patient is on sertraline (ZOLOFT) 100 MG tablet 90 tablet 1 4/15/2023    Sig: Take 1.5 tablets by mouth daily   Sent to pharmacy as: sertraline (ZOLOFT) 100 MG tablet   E-Prescribing Status: Receipt confirmed by pharmacy (4/15/2023  6:08 PM CDT) And pharmacist wanted to let it be known it may be a drug interaction. She wanted to verify. Please contact to advise. Thanks!

## 2023-05-04 ENCOUNTER — LAB VISIT (OUTPATIENT)
Dept: LAB | Facility: HOSPITAL | Age: 68
End: 2023-05-04
Attending: DERMATOLOGY
Payer: MEDICARE

## 2023-05-04 DIAGNOSIS — L29.8 OTHER PRURITUS: ICD-10-CM

## 2023-05-04 DIAGNOSIS — K11.7 XEROSTOMIA: ICD-10-CM

## 2023-05-04 DIAGNOSIS — R20.8 BURNING SENSATION OF MOUTH: ICD-10-CM

## 2023-05-04 DIAGNOSIS — R63.8 OTHER SYMPTOMS AND SIGNS CONCERNING FOOD AND FLUID INTAKE: ICD-10-CM

## 2023-05-04 LAB
FERRITIN SERPL-MCNC: 80 NG/ML (ref 20–300)
FOLATE SERPL-MCNC: 15.8 NG/ML (ref 4–24)
IRON SERPL-MCNC: 56 UG/DL (ref 30–160)
SATURATED IRON: 16 % (ref 20–50)
TOTAL IRON BINDING CAPACITY: 342 UG/DL (ref 250–450)
TRANSFERRIN SERPL-MCNC: 231 MG/DL (ref 200–375)

## 2023-05-04 PROCEDURE — 82728 ASSAY OF FERRITIN: CPT | Performed by: DERMATOLOGY

## 2023-05-04 PROCEDURE — 86235 NUCLEAR ANTIGEN ANTIBODY: CPT | Performed by: DERMATOLOGY

## 2023-05-04 PROCEDURE — 36415 COLL VENOUS BLD VENIPUNCTURE: CPT | Mod: PO | Performed by: DERMATOLOGY

## 2023-05-04 PROCEDURE — 86038 ANTINUCLEAR ANTIBODIES: CPT | Performed by: DERMATOLOGY

## 2023-05-04 PROCEDURE — 84466 ASSAY OF TRANSFERRIN: CPT | Performed by: DERMATOLOGY

## 2023-05-04 PROCEDURE — 82746 ASSAY OF FOLIC ACID SERUM: CPT | Performed by: DERMATOLOGY

## 2023-05-05 LAB
ANA SER QL IF: NORMAL
ANTI-SSA ANTIBODY: 0.11 RATIO (ref 0–0.99)
ANTI-SSA INTERPRETATION: NEGATIVE

## 2023-05-31 ENCOUNTER — HOSPITAL ENCOUNTER (EMERGENCY)
Facility: HOSPITAL | Age: 68
Discharge: HOME | End: 2023-05-31
Attending: EMERGENCY MEDICINE
Payer: MEDICARE

## 2023-05-31 VITALS
RESPIRATION RATE: 20 BRPM | WEIGHT: 240 LBS | HEIGHT: 66 IN | DIASTOLIC BLOOD PRESSURE: 81 MMHG | TEMPERATURE: 97 F | HEART RATE: 72 BPM | OXYGEN SATURATION: 99 % | BODY MASS INDEX: 38.57 KG/M2 | SYSTOLIC BLOOD PRESSURE: 171 MMHG

## 2023-05-31 DIAGNOSIS — R10.13 EPIGASTRIC PAIN: Primary | ICD-10-CM

## 2023-05-31 LAB
ALBUMIN SERPL BCP-MCNC: 3.8 G/DL (ref 3.5–5.2)
ALP SERPL-CCNC: 92 U/L (ref 55–135)
ALT SERPL W/O P-5'-P-CCNC: 20 U/L (ref 10–44)
ANION GAP SERPL CALC-SCNC: 11 MMOL/L (ref 8–16)
AST SERPL-CCNC: 20 U/L (ref 10–40)
BASOPHILS # BLD AUTO: 0.03 K/UL (ref 0–0.2)
BASOPHILS NFR BLD: 0.5 % (ref 0–1.9)
BILIRUB SERPL-MCNC: 0.5 MG/DL (ref 0.1–1)
BUN SERPL-MCNC: 18 MG/DL (ref 8–23)
CALCIUM SERPL-MCNC: 9.2 MG/DL (ref 8.7–10.5)
CHLORIDE SERPL-SCNC: 104 MMOL/L (ref 95–110)
CO2 SERPL-SCNC: 26 MMOL/L (ref 23–29)
CREAT SERPL-MCNC: 1 MG/DL (ref 0.5–1.4)
DIFFERENTIAL METHOD: NORMAL
EOSINOPHIL # BLD AUTO: 0.1 K/UL (ref 0–0.5)
EOSINOPHIL NFR BLD: 2.4 % (ref 0–8)
ERYTHROCYTE [DISTWIDTH] IN BLOOD BY AUTOMATED COUNT: 13.9 % (ref 11.5–14.5)
EST. GFR  (NO RACE VARIABLE): >60 ML/MIN/1.73 M^2
GLUCOSE SERPL-MCNC: 93 MG/DL (ref 70–110)
HCT VFR BLD AUTO: 42.6 % (ref 37–48.5)
HGB BLD-MCNC: 13.7 G/DL (ref 12–16)
IMM GRANULOCYTES # BLD AUTO: 0.01 K/UL (ref 0–0.04)
IMM GRANULOCYTES NFR BLD AUTO: 0.2 % (ref 0–0.5)
LIPASE SERPL-CCNC: 32 U/L (ref 4–60)
LYMPHOCYTES # BLD AUTO: 1.3 K/UL (ref 1–4.8)
LYMPHOCYTES NFR BLD: 23 % (ref 18–48)
MCH RBC QN AUTO: 28.7 PG (ref 27–31)
MCHC RBC AUTO-ENTMCNC: 32.2 G/DL (ref 32–36)
MCV RBC AUTO: 89 FL (ref 82–98)
MONOCYTES # BLD AUTO: 0.5 K/UL (ref 0.3–1)
MONOCYTES NFR BLD: 9 % (ref 4–15)
NEUTROPHILS # BLD AUTO: 3.7 K/UL (ref 1.8–7.7)
NEUTROPHILS NFR BLD: 64.9 % (ref 38–73)
NRBC BLD-RTO: 0 /100 WBC
PLATELET # BLD AUTO: 234 K/UL (ref 150–450)
PMV BLD AUTO: 9.3 FL (ref 9.2–12.9)
POTASSIUM SERPL-SCNC: 3.8 MMOL/L (ref 3.5–5.1)
PROT SERPL-MCNC: 7.8 G/DL (ref 6–8.4)
RBC # BLD AUTO: 4.77 M/UL (ref 4–5.4)
SODIUM SERPL-SCNC: 141 MMOL/L (ref 136–145)
TROPONIN I SERPL DL<=0.01 NG/ML-MCNC: 0.01 NG/ML (ref 0–0.03)
WBC # BLD AUTO: 5.75 K/UL (ref 3.9–12.7)

## 2023-05-31 PROCEDURE — 99285 EMERGENCY DEPT VISIT HI MDM: CPT | Mod: 25

## 2023-05-31 PROCEDURE — 63600175 PHARM REV CODE 636 W HCPCS: Performed by: PHYSICIAN ASSISTANT

## 2023-05-31 PROCEDURE — 85025 COMPLETE CBC W/AUTO DIFF WBC: CPT | Performed by: PHYSICIAN ASSISTANT

## 2023-05-31 PROCEDURE — 84484 ASSAY OF TROPONIN QUANT: CPT | Performed by: PHYSICIAN ASSISTANT

## 2023-05-31 PROCEDURE — 25000003 PHARM REV CODE 250: Performed by: PHYSICIAN ASSISTANT

## 2023-05-31 PROCEDURE — 93005 ELECTROCARDIOGRAM TRACING: CPT

## 2023-05-31 PROCEDURE — 83690 ASSAY OF LIPASE: CPT | Performed by: PHYSICIAN ASSISTANT

## 2023-05-31 PROCEDURE — 93010 EKG 12-LEAD: ICD-10-PCS | Mod: ,,, | Performed by: GENERAL PRACTICE

## 2023-05-31 PROCEDURE — 36415 COLL VENOUS BLD VENIPUNCTURE: CPT | Performed by: PHYSICIAN ASSISTANT

## 2023-05-31 PROCEDURE — 80053 COMPREHEN METABOLIC PANEL: CPT | Performed by: PHYSICIAN ASSISTANT

## 2023-05-31 PROCEDURE — 93010 ELECTROCARDIOGRAM REPORT: CPT | Mod: ,,, | Performed by: GENERAL PRACTICE

## 2023-05-31 PROCEDURE — 96375 TX/PRO/DX INJ NEW DRUG ADDON: CPT

## 2023-05-31 PROCEDURE — 96374 THER/PROPH/DIAG INJ IV PUSH: CPT

## 2023-05-31 RX ORDER — MAG HYDROX/ALUMINUM HYD/SIMETH 200-200-20
30 SUSPENSION, ORAL (FINAL DOSE FORM) ORAL ONCE
Status: COMPLETED | OUTPATIENT
Start: 2023-05-31 | End: 2023-05-31

## 2023-05-31 RX ORDER — ONDANSETRON 2 MG/ML
8 INJECTION INTRAMUSCULAR; INTRAVENOUS
Status: COMPLETED | OUTPATIENT
Start: 2023-05-31 | End: 2023-05-31

## 2023-05-31 RX ORDER — FAMOTIDINE 10 MG/ML
20 INJECTION INTRAVENOUS
Status: COMPLETED | OUTPATIENT
Start: 2023-05-31 | End: 2023-05-31

## 2023-05-31 RX ORDER — LIDOCAINE HYDROCHLORIDE 20 MG/ML
15 SOLUTION OROPHARYNGEAL ONCE
Status: COMPLETED | OUTPATIENT
Start: 2023-05-31 | End: 2023-05-31

## 2023-05-31 RX ADMIN — LIDOCAINE HYDROCHLORIDE 15 ML: 20 SOLUTION ORAL; TOPICAL at 01:05

## 2023-05-31 RX ADMIN — FAMOTIDINE 20 MG: 10 INJECTION, SOLUTION INTRAVENOUS at 12:05

## 2023-05-31 RX ADMIN — ONDANSETRON 8 MG: 2 INJECTION INTRAMUSCULAR; INTRAVENOUS at 12:05

## 2023-05-31 RX ADMIN — ALUMINUM HYDROXIDE, MAGNESIUM HYDROXIDE, AND SIMETHICONE 30 ML: 200; 200; 20 SUSPENSION ORAL at 01:05

## 2023-05-31 NOTE — ED PROVIDER NOTES
Encounter Date: 2023    SCRIBE #1 NOTE: Basilio VELAZCO, brandin scribing for, and in the presence of,  Jaqui Abbasi PA-C.     History     Chief Complaint   Patient presents with    Abdominal Pain     Upper epigastric pain since last night denies nausea vomiting      Time seen by provider: 11:40 AM on 2023    Marylou Bender is a 68 y.o. female who presents to the ED with upper abdominal pain since last night. The patient reports experiencing upper abdominal pain that lasts for about 5-6 since last night. She is unable to describe the sensation of the pain. The patient also endorses some intermittent palpitations that is not associated with the abdominal pain as well as looser stools. She took some Pepcid this morning PTA as she suspected reflux. The patient mentions that she still has her gallbladder, and that the pain is not exacerbated with eating. The patient denies cough, SOB, or any other symptoms at this time. PMHx of CKD, heart fluttering, and diverticulitis. PSHx of  section, hysterectomy, and appendectomy.    The history is provided by the patient.   Review of patient's allergies indicates:   Allergen Reactions    Nsaids (non-steroidal anti-inflammatory drug)      Past Medical History:   Diagnosis Date    CKD (chronic kidney disease) stage 3, GFR 30-59 ml/min     Diverticulitis     Fluttering heart     PTSD (post-traumatic stress disorder)     Stress incontinence (female)      Past Surgical History:   Procedure Laterality Date    APPENDECTOMY      BUNIONECTOMY Left      SECTION      HYSTERECTOMY      TONSILLECTOMY       Family History   Problem Relation Age of Onset    Cancer Mother         colon    Thyroid disease Mother     Kidney disease Mother     Heart disease Mother     Stroke Mother     Diabetes Mother     Heart disease Father     Thyroid disease Sister     Cancer Maternal Grandmother 62        Colon     Social History     Tobacco Use    Smoking status: Never    Smokeless  tobacco: Never   Substance Use Topics    Alcohol use: Yes     Comment: rarely    Drug use: Never     Review of Systems   Constitutional:  Negative for chills and fever.   Respiratory:  Negative for cough, chest tightness, shortness of breath and wheezing.    Cardiovascular:  Positive for palpitations (intermittent and unassociated with abdominal pain). Negative for chest pain.   Gastrointestinal:  Positive for abdominal pain. Negative for diarrhea, nausea and vomiting.   Genitourinary:  Negative for dysuria and hematuria.   Musculoskeletal:  Negative for arthralgias, back pain, joint swelling, myalgias, neck pain and neck stiffness.   Skin:  Negative for color change, pallor, rash and wound.   Neurological:  Negative for dizziness, syncope, weakness, light-headedness, numbness and headaches.   Hematological:  Does not bruise/bleed easily.   Psychiatric/Behavioral:  The patient is not nervous/anxious.    All other systems reviewed and are negative.    Physical Exam     Initial Vitals [05/31/23 1121]   BP Pulse Resp Temp SpO2   (!) 171/81 72 20 97.3 °F (36.3 °C) 99 %      MAP       --         Physical Exam    Nursing note and vitals reviewed.  Constitutional: She appears well-developed and well-nourished. She is not diaphoretic. No distress.   HENT:   Head: Normocephalic and atraumatic.   Mouth/Throat: Oropharynx is clear and moist.   Eyes: Conjunctivae are normal.   Neck: Neck supple.   Normal range of motion.  Cardiovascular:  Normal rate, regular rhythm, normal heart sounds and intact distal pulses.     Exam reveals no gallop and no friction rub.       No murmur heard.  Pulmonary/Chest: Breath sounds normal. No respiratory distress. She has no wheezes. She has no rhonchi. She has no rales.   Abdominal: Abdomen is soft. She exhibits no distension and no mass. There is no abdominal tenderness.   No palpable abdominal tenderness noted.      Musculoskeletal:         General: No tenderness or edema. Normal range of  motion.      Cervical back: Normal range of motion and neck supple.     Neurological: She is alert and oriented to person, place, and time. She has normal strength. No sensory deficit.   Skin: Skin is warm and dry. No rash and no abscess noted. No erythema.   Psychiatric: She has a normal mood and affect.       ED Course   Procedures  Labs Reviewed   CBC W/ AUTO DIFFERENTIAL   COMPREHENSIVE METABOLIC PANEL   LIPASE   TROPONIN I     EKG Readings: (Independently Interpreted)   Initial Reading: No STEMI. Rhythm: Normal Sinus Rhythm. Heart Rate: 63. Clinical Impression: Normal Sinus Rhythm     Imaging Results              X-Ray Chest PA And Lateral (Final result)  Result time 05/31/23 12:50:31      Final result by Eddie Rios MD (05/31/23 12:50:31)                   Impression:      Negative chest.      Electronically signed by: Eddie Riso MD  Date:    05/31/2023  Time:    12:50               Narrative:    EXAMINATION:  XR CHEST PA AND LATERAL    CLINICAL HISTORY:  Epigastric pain    TECHNIQUE:  PA and lateral views of the chest were performed.    COMPARISON:  None    FINDINGS:  The cardiomediastinal silhouette is within normal limits.  The lungs are well expanded without consolidation or pleural effusion.                                       Medications   famotidine (PF) injection 20 mg (20 mg Intravenous Given 5/31/23 1207)   ondansetron injection 8 mg (8 mg Intravenous Given 5/31/23 1207)   aluminum-magnesium hydroxide-simethicone 200-200-20 mg/5 mL suspension 30 mL (30 mLs Oral Given 5/31/23 1322)     And   LIDOcaine HCl 2% oral solution 15 mL (15 mLs Oral Given 5/31/23 1323)     Medical Decision Making:   History:   Old Medical Records: I decided to obtain old medical records.  Old Records Summarized: records from clinic visits and records from previous admission(s).  Differential Diagnosis:   Gastritis   GERD   ACS   Pleurisy     Independently Interpreted Test(s):   I have ordered and  independently interpreted EKG Reading(s) - see prior notes  Clinical Tests:   Lab Tests: Ordered and Reviewed  Radiological Study: Ordered and Reviewed  Medical Tests: Ordered and Reviewed  ED Management:  Pt emergently evaluated here in the ED.   Pt is well appearing and denies any chest pain or current palpitations.  EKG shows no evidence for acute arrhthymias or ischemia.  Troponin negative.  Lipase negative.  No leukocytosis, renal dysfunction or abnormal LFTs. CXR shows no evidence for acute intrapulmonary process.  She will be discharged home to follow-up with gastroenterology for re-evaluation and further management.  Patient voices understanding and is agreeable to the plan.  Specific return precautions are given.          Scribe Attestation:   Scribe #1: I performed the above scribed service and the documentation accurately describes the services I performed. I attest to the accuracy of the note.                 I, Jaqui Abbasi PA-C, personally performed the services described in this documentation. All medical record entries made by the scribe were at my direction and in my presence.  I have reviewed the chart and agree that the record reflects my personal performance and is accurate and complete. Jaqui Abbasi PA-C.  8:12 PM 05/31/2023    Clinical Impression:   Final diagnoses:  [R10.13] Epigastric pain (Primary)        ED Disposition Condition    Discharge Stable          ED Prescriptions    None       Follow-up Information       Follow up With Specialties Details Why Contact [facility] Emergency Medicine  As needed, If symptoms worsen [address] [place] [id number] [phone]             Jaqui Abbasi PA-C  05/31/23 2012

## 2023-06-06 ENCOUNTER — TELEPHONE (OUTPATIENT)
Dept: FAMILY MEDICINE | Facility: CLINIC | Age: 68
End: 2023-06-06
Payer: MEDICARE

## 2023-06-06 NOTE — TELEPHONE ENCOUNTER
----- Message from Bj Grimm III, MD sent at 5/31/2023  9:50 PM CDT -----  NORMAL followup as needed.

## 2023-06-08 ENCOUNTER — OFFICE VISIT (OUTPATIENT)
Dept: PODIATRY | Facility: CLINIC | Age: 68
End: 2023-06-08
Payer: MEDICARE

## 2023-06-08 VITALS — WEIGHT: 240 LBS | RESPIRATION RATE: 18 BRPM | BODY MASS INDEX: 38.57 KG/M2 | HEIGHT: 66 IN

## 2023-06-08 DIAGNOSIS — M76.822 POSTERIOR TIBIAL TENDON DYSFUNCTION (PTTD) OF LEFT LOWER EXTREMITY: Primary | ICD-10-CM

## 2023-06-08 DIAGNOSIS — G89.29 CHRONIC PAIN OF LEFT ANKLE: ICD-10-CM

## 2023-06-08 DIAGNOSIS — M25.372 ANKLE INSTABILITY, LEFT: ICD-10-CM

## 2023-06-08 DIAGNOSIS — M25.572 CHRONIC PAIN OF LEFT ANKLE: ICD-10-CM

## 2023-06-08 PROCEDURE — 99213 OFFICE O/P EST LOW 20 MIN: CPT | Mod: PBBFAC,PN | Performed by: PODIATRIST

## 2023-06-08 PROCEDURE — 99999 PR PBB SHADOW E&M-EST. PATIENT-LVL III: ICD-10-PCS | Mod: PBBFAC,,, | Performed by: PODIATRIST

## 2023-06-08 PROCEDURE — 99999 PR PBB SHADOW E&M-EST. PATIENT-LVL III: CPT | Mod: PBBFAC,,, | Performed by: PODIATRIST

## 2023-06-08 PROCEDURE — 99203 OFFICE O/P NEW LOW 30 MIN: CPT | Mod: S$PBB,,, | Performed by: PODIATRIST

## 2023-06-08 PROCEDURE — 99203 PR OFFICE/OUTPT VISIT, NEW, LEVL III, 30-44 MIN: ICD-10-PCS | Mod: S$PBB,,, | Performed by: PODIATRIST

## 2023-06-08 NOTE — PROGRESS NOTES
"  [address] [facility] [address] [place] [address]  Phone: [phone]   [phone]    Patient's PCP:Bj Grimm III, MD  Referring Provider: Aaareferral Self    Subjective:      Chief Complaint:: Foot Pain, Ankle Pain, and Flat Foot    HPI  Marylou Bender is a 68 y.o. female who presents today with a complaint of left medial ankle pain lasting for several months. No trauma. Pain is worse with weight bearing and walking.   Previous evaluation by orthopedics and referral to podiatry.   Xrays taken at visit with orthopedics on  2023 reviewed.       Vitals:    23 1535   Resp: 18   Weight: 108.9 kg (240 lb)   Height: 5' 6" (1.676 m)   PainSc:   4      Shoe Size:     Past Surgical History:   Procedure Laterality Date    APPENDECTOMY      BUNIONECTOMY Left      SECTION      HYSTERECTOMY      TONSILLECTOMY       Past Medical History:   Diagnosis Date    CKD (chronic kidney disease) stage 3, GFR 30-59 ml/min     Diverticulitis     Fluttering heart     PTSD (post-traumatic stress disorder)     Stress incontinence (female)      Family History   Problem Relation Age of Onset    Cancer Mother         colon    Thyroid disease Mother     Kidney disease Mother     Heart disease Mother     Stroke Mother     Diabetes Mother     Heart disease Father     Thyroid disease Sister     Cancer Maternal Grandmother 62        Colon        Social History:   Marital Status:   Alcohol History:  reports current alcohol use.  Tobacco History:  reports that she has never smoked. She has never used smokeless tobacco.  Drug History:  reports no history of drug use.    Review of patient's allergies indicates:   Allergen Reactions    Nsaids (non-steroidal anti-inflammatory drug)        Current Outpatient Medications   Medication Sig Dispense Refill    ALPRAZolam (XANAX) 0.5 MG tablet Take 1 tablet (0.5 mg total) by mouth 3 (three) times daily as needed for Anxiety. 45 tablet 2    amitriptyline (ELAVIL) 25 MG tablet " Take 1 tablet (25 mg total) by mouth nightly. 90 tablet 1    cyanocobalamin 1,000 mcg/mL injection INJECT 1 ML TOTAL INTO THE MUSCLE WEEKLY X 4 WEEKS. THEN INJECT 1 ML INTO THE MUSCLE MONTHLY 6 mL 1    doxycycline 50 MG capsule Take 1 capsule (50 mg total) by mouth once daily. 30 capsule 2    sertraline (ZOLOFT) 100 MG tablet Take 1.5 tablets by mouth daily 90 tablet 1    traZODone (DESYREL) 50 MG tablet Take 1 tablet (50 mg total) by mouth every evening. 90 tablet 1     No current facility-administered medications for this visit.       Review of Systems   Constitutional:  Negative for chills, fatigue, fever and unexpected weight change.   HENT:  Negative for hearing loss and trouble swallowing.    Eyes:  Negative for photophobia and visual disturbance.   Respiratory:  Negative for cough, shortness of breath and wheezing.    Cardiovascular:  Negative for chest pain, palpitations and leg swelling.   Gastrointestinal:  Negative for abdominal pain and nausea.   Genitourinary:  Negative for dysuria and frequency.   Musculoskeletal:  Negative for arthralgias, back pain, gait problem, joint swelling and myalgias.   Skin:  Negative for rash and wound.   Neurological:  Negative for tremors, seizures, speech difficulty, weakness and headaches.   Hematological:  Does not bruise/bleed easily.       Objective:        Physical Exam:   Foot Exam  Physical Exam  Ortho/SPM Exam   Constitutional: Patient is oriented to person, place, and time. Patient appears well-developed and well-nourished. No acute distress.      Psychiatric: Patient has a normal mood and affect. Patient's speech is normal and behavior is normal. Judgment is normal. Cognition and memory are normal.     Skin is normal age and health appropriate color, turgor, texture, and temperature bilateral lower extremities without ulceration, hyperpigmentation, discoloration, masses nodules or cords palpated.  No ecchymosis, erythema, edema, or cardinal signs of infection  bilateral lower extremities.      Examination of the patient's right foot and ankle shows no signs of rashes or erythema. The patient has no ecchymosis or effusion or masses. The patient has a negative anterior drawer and talar tilting exam. The patient has full range of motion of ankle dorsiflexion, plantarflexion, inversion, and eversion. Patient has 5 out of 5 motor strength in all muscle groups.The patient is nontender over both medial ankle ligaments and medial malleolus as well as lateral ankle ligaments and the lateral malleolus. Negative squeeze test.       Examination of the patient's left foot and ankle shows no signs of rashes or erythema. The patient has no ecchymosis or effusion or masses. The patient has a negative anterior drawer and talar tilting exam. The patient has full range of motion of ankle dorsiflexion, plantarflexion, inversion, and eversion. Patient has 5 out of 5 motor strength in all muscle groups.The patient is nontender over both medial ankle ligaments and medial malleolus as well as lateral ankle ligaments and the lateral malleolus. Negative squeeze test.  Patient has tenderness along the posterior tibial course of the left, near the medial malleolus and into the foot.  No bruising.  No swelling.  No signs of infection    Otherwise, Adequate joint range of motion without pain, limitation, nor crepitation Bilateral feet and ankle joints. Muscle strength is 5/5 in all groups bilaterally.     Protective sensation intact. Pain sensation intact bilateral feet and legs.    DP and PT pulses 2/4 bilateral, capillary refill 3 seconds all toes/distal feet, all toes/both feet warm to touch.       Imaging:   Xray from 02/16/23:  XRAY Report / Interpretation:   Three views of the left ankle within normal limits.  She is noted to have retained hardware in the foot consistent with prior surgical intervention.          Assessment:       1. Posterior tibial tendon dysfunction (PTTD) of left lower  extremity - Left Foot    2. Chronic pain of left ankle    3. Ankle instability, left      Plan:   Posterior tibial tendon dysfunction (PTTD) of left lower extremity - Left Foot  -     IMMOBILIZER FOR HOME USE  -     AIR CAST WALKER BOOT FOR HOME USE    Chronic pain of left ankle    Ankle instability, left      No follow-ups on file.    Procedures          Counseling:     I provided patient education verbally regarding:   Patient diagnosis, treatment options, as well as alternatives, risks, and benefits.     This note was created using Dragon voice recognition software that occasionally misinterpreted phrases or words.     Recommend patient wear a cam boot with ankle brace for the next 4-6 weeks. Patient to transition into regular running shoe with ankle brace at 4 weeks.     Discussed posterior tibial tendon dysfunction.  I explained the possible need of an MRI of the ankle if the pain continues.    I also recommend physical therapy. Patient is leaving for [place] next week, but will look into getting physical therapy in [place] for PTTD.     Patient should call the office immediately if any signs of infection, such as fever, chills, sweats, increased redness or pain.    Patient was instructed to call the clinic or go to the emergency department if their symptoms do not improve, worsens, or if new symptoms develop.  Patient was advised that if any increased swelling, pain, or numbness arise to go immediately to the ED. Patient knows to call any time if an emergency arises. Shared decision making occurred and patient verbalized understanding in agreement with this plan.

## 2023-06-10 ENCOUNTER — PATIENT MESSAGE (OUTPATIENT)
Dept: FAMILY MEDICINE | Facility: CLINIC | Age: 68
End: 2023-06-10
Payer: MEDICARE

## 2023-06-12 DIAGNOSIS — E55.9 VITAMIN D DEFICIENCY: ICD-10-CM

## 2023-06-12 DIAGNOSIS — E53.8 VITAMIN B12 DEFICIENCY: Primary | ICD-10-CM

## 2023-06-14 ENCOUNTER — LAB VISIT (OUTPATIENT)
Dept: LAB | Facility: HOSPITAL | Age: 68
End: 2023-06-14
Attending: FAMILY MEDICINE
Payer: MEDICARE

## 2023-06-14 DIAGNOSIS — E55.9 VITAMIN D DEFICIENCY: ICD-10-CM

## 2023-06-14 DIAGNOSIS — E53.8 VITAMIN B12 DEFICIENCY: ICD-10-CM

## 2023-06-14 LAB
25(OH)D3+25(OH)D2 SERPL-MCNC: 42 NG/ML (ref 30–96)
VIT B12 SERPL-MCNC: 583 PG/ML (ref 210–950)

## 2023-06-14 PROCEDURE — 36415 COLL VENOUS BLD VENIPUNCTURE: CPT | Mod: PO | Performed by: FAMILY MEDICINE

## 2023-06-14 PROCEDURE — 82607 VITAMIN B-12: CPT | Performed by: FAMILY MEDICINE

## 2023-06-14 PROCEDURE — 82306 VITAMIN D 25 HYDROXY: CPT | Performed by: FAMILY MEDICINE

## 2023-06-16 ENCOUNTER — TELEPHONE (OUTPATIENT)
Dept: FAMILY MEDICINE | Facility: CLINIC | Age: 68
End: 2023-06-16
Payer: MEDICARE

## 2023-07-28 LAB — CRC RECOMMENDATION EXT: NORMAL

## 2023-08-19 DIAGNOSIS — L71.9 ROSACEA: ICD-10-CM

## 2023-08-19 RX ORDER — DOXYCYCLINE HYCLATE 50 MG/1
50 CAPSULE ORAL
Qty: 30 CAPSULE | Refills: 0 | Status: SHIPPED | OUTPATIENT
Start: 2023-08-19 | End: 2023-10-17 | Stop reason: SDUPTHER

## 2023-08-24 ENCOUNTER — PATIENT MESSAGE (OUTPATIENT)
Dept: ADMINISTRATIVE | Facility: HOSPITAL | Age: 68
End: 2023-08-24
Payer: MEDICARE

## 2023-08-25 ENCOUNTER — PATIENT OUTREACH (OUTPATIENT)
Dept: ADMINISTRATIVE | Facility: HOSPITAL | Age: 68
End: 2023-08-25
Payer: MEDICARE

## 2023-09-05 ENCOUNTER — PATIENT OUTREACH (OUTPATIENT)
Dept: ADMINISTRATIVE | Facility: HOSPITAL | Age: 68
End: 2023-09-05
Payer: MEDICARE

## 2023-09-05 NOTE — LETTER
AUTHORIZATION FOR RELEASE OF   CONFIDENTIAL INFORMATION    Dear Dr Whitaker,    We are seeing Marylou Bender, date of birth 1955, in the clinic at [facility]. Bj Grimm III, MD is the patient's PCP. Marylou Bender has an outstanding lab/procedure at the time we reviewed her chart. In order to help keep her health information updated, she has authorized us to request the following medical record(s):        (  )  MAMMOGRAM                                      ( X )  COLONOSCOPY      (  )  PAP SMEAR                                          (  )  OUTSIDE LAB RESULTS     (  )  DEXA SCAN                                          (  )  EYE EXAM            (  )  FOOT EXAM                                          (  )  ENTIRE RECORD     (  )  OUTSIDE IMMUNIZATIONS                 (  )  _______________         Please fax records to [facility], Sharp, Clinton H. III, MD at [phone]    Thanks so much and have a great day!    Margaret Quintero LPN [facility]  [address] [place] [id number]  - [phone]   [phone]           Patient Name: Marylou Bender  : 1955  Patient Phone #: [phone]

## 2023-09-08 ENCOUNTER — PATIENT OUTREACH (OUTPATIENT)
Dept: ADMINISTRATIVE | Facility: HOSPITAL | Age: 68
End: 2023-09-08
Payer: MEDICARE

## 2023-09-18 ENCOUNTER — PATIENT MESSAGE (OUTPATIENT)
Dept: FAMILY MEDICINE | Facility: CLINIC | Age: 68
End: 2023-09-18
Payer: MEDICARE

## 2023-10-04 ENCOUNTER — PATIENT MESSAGE (OUTPATIENT)
Dept: FAMILY MEDICINE | Facility: CLINIC | Age: 68
End: 2023-10-04
Payer: MEDICARE

## 2023-10-17 ENCOUNTER — OFFICE VISIT (OUTPATIENT)
Dept: FAMILY MEDICINE | Facility: CLINIC | Age: 68
End: 2023-10-17
Payer: MEDICARE

## 2023-10-17 ENCOUNTER — PATIENT MESSAGE (OUTPATIENT)
Dept: FAMILY MEDICINE | Facility: CLINIC | Age: 68
End: 2023-10-17

## 2023-10-17 VITALS
SYSTOLIC BLOOD PRESSURE: 124 MMHG | HEART RATE: 70 BPM | TEMPERATURE: 98 F | HEIGHT: 66 IN | BODY MASS INDEX: 38.74 KG/M2 | OXYGEN SATURATION: 98 % | DIASTOLIC BLOOD PRESSURE: 70 MMHG

## 2023-10-17 DIAGNOSIS — K14.6 BURNING TONGUE: Primary | ICD-10-CM

## 2023-10-17 DIAGNOSIS — E53.8 VITAMIN B12 DEFICIENCY: ICD-10-CM

## 2023-10-17 DIAGNOSIS — G47.00 INSOMNIA, UNSPECIFIED TYPE: ICD-10-CM

## 2023-10-17 DIAGNOSIS — L71.9 ROSACEA: ICD-10-CM

## 2023-10-17 DIAGNOSIS — E55.9 VITAMIN D DEFICIENCY: ICD-10-CM

## 2023-10-17 DIAGNOSIS — F41.9 ANXIETY: ICD-10-CM

## 2023-10-17 DIAGNOSIS — S50.11XS: ICD-10-CM

## 2023-10-17 DIAGNOSIS — U07.1 COVID: ICD-10-CM

## 2023-10-17 DIAGNOSIS — M25.512 ARTHRALGIA OF LEFT SHOULDER REGION: ICD-10-CM

## 2023-10-17 DIAGNOSIS — F43.10 PTSD (POST-TRAUMATIC STRESS DISORDER): ICD-10-CM

## 2023-10-17 PROCEDURE — 99214 OFFICE O/P EST MOD 30 MIN: CPT | Mod: S$GLB,,, | Performed by: FAMILY MEDICINE

## 2023-10-17 PROCEDURE — 99214 PR OFFICE/OUTPT VISIT, EST, LEVL IV, 30-39 MIN: ICD-10-PCS | Mod: S$GLB,,, | Performed by: FAMILY MEDICINE

## 2023-10-17 RX ORDER — TRAZODONE HYDROCHLORIDE 50 MG/1
50 TABLET ORAL NIGHTLY
Qty: 90 TABLET | Refills: 1 | Status: SHIPPED | OUTPATIENT
Start: 2023-10-17 | End: 2024-03-04

## 2023-10-17 RX ORDER — ALPRAZOLAM 0.5 MG/1
0.5 TABLET ORAL 3 TIMES DAILY PRN
Qty: 45 TABLET | Refills: 2 | Status: SHIPPED | OUTPATIENT
Start: 2023-10-17 | End: 2023-11-28 | Stop reason: SDUPTHER

## 2023-10-17 RX ORDER — SERTRALINE HYDROCHLORIDE 100 MG/1
TABLET, FILM COATED ORAL
Qty: 90 TABLET | Refills: 2 | Status: SHIPPED | OUTPATIENT
Start: 2023-10-17

## 2023-10-17 RX ORDER — DOXYCYCLINE HYCLATE 50 MG/1
50 CAPSULE ORAL 2 TIMES DAILY
Qty: 30 CAPSULE | Refills: 0 | Status: SHIPPED | OUTPATIENT
Start: 2023-10-17 | End: 2023-11-28 | Stop reason: SDUPTHER

## 2023-10-17 NOTE — TELEPHONE ENCOUNTER
No care due was identified.  [facility] Embedded Care Due Messages. Reference number: [id number].   10/17/2023 4:30:13 PM CDT

## 2023-10-18 ENCOUNTER — TELEPHONE (OUTPATIENT)
Dept: FAMILY MEDICINE | Facility: CLINIC | Age: 68
End: 2023-10-18
Payer: MEDICARE

## 2023-10-19 ENCOUNTER — PATIENT MESSAGE (OUTPATIENT)
Dept: FAMILY MEDICINE | Facility: CLINIC | Age: 68
End: 2023-10-19
Payer: MEDICARE

## 2023-10-19 DIAGNOSIS — L71.9 ROSACEA: ICD-10-CM

## 2023-10-19 PROBLEM — K14.6 BURNING TONGUE: Status: ACTIVE | Noted: 2023-10-19

## 2023-10-20 NOTE — PROGRESS NOTES
Subjective:       Patient ID: Marylou Bender is a 68 y.o. female.    Chief Complaint: Medication Refill    Had COVID while in [place].  Sick for couple of weeks.  Still slightly ill.  Fever for about 4 days.  Burning tongue.  Has had some trouble for year more.  Saw dentist recommended check for heavy metals.  Vitamin-D deficiency level 42 using 2000 per day.  Also B12 deficiency level 583 using sublingual B12 daily.  Had a contusion of the left forehead year so ago.  Still has some sensitivity to touch.  Arthralgia of the shoulders.  Left 1 was injected.  Painful still.  Had seen Dr. Wisdom.  This was done about 8 or 9 months ago.  Would like to go again.    Physical examination.  Vital signs noted.  Tympanic membranes without erythema pharynx without erythema no exudate.  Neck without adenopathy.  Chest clear.  Heart regular rate rhythm.  Abdomen bowel sounds are positive soft nontender.  Extremities without edema positive pedal pulses.  Mucous membranes are moist.  No tongue or oral lesions.        Objective:        Assessment:       1. Burning tongue    2. Vitamin D deficiency    3. Vitamin B12 deficiency    4. Contusion of right forearm, sequela    5. Arthralgia of left shoulder region    6. COVID        Plan:       Burning tongue  -     HEAVY METALS SCREEN, BLOOD (QUANTITATIVE); Future; Expected date: 10/17/2023    Vitamin D deficiency  -     Cancel: Vitamin D; Future; Expected date: 10/17/2023    Vitamin B12 deficiency    Contusion of right forearm, sequela    Arthralgia of left shoulder region  -     Ambulatory referral/consult to Orthopedics; Future; Expected date: 10/24/2023    COVID    No further treatment for COVID.  Symptoms should resolve.  Sublingual B12 vitamin-D.  Heavy metal screen.  To Dr. Wisdom regarding the shoulder.  Observe the forehead for now.

## 2023-10-24 ENCOUNTER — PATIENT MESSAGE (OUTPATIENT)
Dept: FAMILY MEDICINE | Facility: CLINIC | Age: 68
End: 2023-10-24
Payer: MEDICARE

## 2023-10-24 RX ORDER — AMOXICILLIN AND CLAVULANATE POTASSIUM 875; 125 MG/1; MG/1
1 TABLET, FILM COATED ORAL EVERY 12 HOURS
Qty: 20 TABLET | Refills: 0 | Status: ON HOLD | OUTPATIENT
Start: 2023-10-24 | End: 2023-12-12 | Stop reason: HOSPADM

## 2023-10-25 ENCOUNTER — LAB VISIT (OUTPATIENT)
Dept: LAB | Facility: HOSPITAL | Age: 68
End: 2023-10-25
Attending: FAMILY MEDICINE
Payer: MEDICARE

## 2023-10-25 DIAGNOSIS — K14.6 BURNING TONGUE: ICD-10-CM

## 2023-10-25 PROCEDURE — 36415 COLL VENOUS BLD VENIPUNCTURE: CPT | Mod: PO | Performed by: FAMILY MEDICINE

## 2023-10-27 LAB
ARSENIC BLD-MCNC: 1 NG/ML
CADMIUM BLD-MCNC: 0.3 NG/ML
CITY: NORMAL
COUNTY: NORMAL
GUARDIAN FIRST NAME: NORMAL
GUARDIAN LAST NAME: NORMAL
HOME PHONE: NORMAL
LEAD BLD-MCNC: <1 MCG/DL
MERCURY BLD-MCNC: 2 NG/ML
RACE: NORMAL
STATE: NORMAL
STREET ADDRESS: NORMAL
VENOUS/CAPILLARY: NORMAL
ZIP: NORMAL

## 2023-11-28 ENCOUNTER — OFFICE VISIT (OUTPATIENT)
Dept: URGENT CARE | Facility: CLINIC | Age: 68
End: 2023-11-28
Payer: MEDICARE

## 2023-11-28 VITALS
OXYGEN SATURATION: 98 % | HEART RATE: 70 BPM | WEIGHT: 240 LBS | HEIGHT: 67 IN | SYSTOLIC BLOOD PRESSURE: 161 MMHG | DIASTOLIC BLOOD PRESSURE: 82 MMHG | RESPIRATION RATE: 16 BRPM | BODY MASS INDEX: 37.67 KG/M2

## 2023-11-28 DIAGNOSIS — L71.9 ROSACEA: ICD-10-CM

## 2023-11-28 DIAGNOSIS — F41.9 ANXIETY: ICD-10-CM

## 2023-11-28 DIAGNOSIS — F43.10 PTSD (POST-TRAUMATIC STRESS DISORDER): ICD-10-CM

## 2023-11-28 DIAGNOSIS — R31.9 HEMATURIA, UNSPECIFIED TYPE: Primary | ICD-10-CM

## 2023-11-28 LAB
BILIRUB UR QL STRIP: NEGATIVE
GLUCOSE UR QL STRIP: NEGATIVE
KETONES UR QL STRIP: NEGATIVE
LEUKOCYTE ESTERASE UR QL STRIP: NEGATIVE
PH, POC UA: 6
POC BLOOD, URINE: POSITIVE
POC NITRATES, URINE: NEGATIVE
PROT UR QL STRIP: NEGATIVE
SP GR UR STRIP: 1.01 (ref 1–1.03)
UROBILINOGEN UR STRIP-ACNC: 0.2 (ref 0.1–1.1)

## 2023-11-28 PROCEDURE — 81003 POCT URINALYSIS, DIPSTICK, AUTOMATED, W/O SCOPE: ICD-10-PCS | Mod: QW,S$GLB,, | Performed by: NURSE PRACTITIONER

## 2023-11-28 PROCEDURE — 99214 PR OFFICE/OUTPT VISIT, EST, LEVL IV, 30-39 MIN: ICD-10-PCS | Mod: S$GLB,,, | Performed by: NURSE PRACTITIONER

## 2023-11-28 PROCEDURE — 99214 OFFICE O/P EST MOD 30 MIN: CPT | Mod: S$GLB,,, | Performed by: NURSE PRACTITIONER

## 2023-11-28 PROCEDURE — 81003 URINALYSIS AUTO W/O SCOPE: CPT | Mod: QW,S$GLB,, | Performed by: NURSE PRACTITIONER

## 2023-11-28 RX ORDER — DOXYCYCLINE HYCLATE 50 MG/1
50 CAPSULE ORAL 2 TIMES DAILY
Qty: 30 CAPSULE | Refills: 0 | Status: SHIPPED | OUTPATIENT
Start: 2023-11-28 | End: 2024-03-04

## 2023-11-28 RX ORDER — ALPRAZOLAM 0.5 MG/1
0.5 TABLET ORAL 3 TIMES DAILY PRN
Qty: 45 TABLET | Refills: 2 | Status: SHIPPED | OUTPATIENT
Start: 2023-11-28 | End: 2024-03-04 | Stop reason: SDUPTHER

## 2023-11-28 NOTE — TELEPHONE ENCOUNTER
No care due was identified.  [facility] Embedded Care Due Messages. Reference number: [id number].   11/28/2023 5:27:30 PM CST

## 2023-11-28 NOTE — PROGRESS NOTES
"Subjective:      Patient ID: Marylou Bender is a 68 y.o. female.    Vitals:  height is 5' 7" (1.702 m) and weight is 108.9 kg (240 lb). Her blood pressure is 161/82 (abnormal) and her pulse is 70. Her respiration is 16 and oxygen saturation is 98%.     Chief Complaint: Hematuria    Patient with past medical history of CKD 3 presents to clinic with complaints of asia hematuria that began last night.  Denies abdominal pain.  Denies burning with urination.  Does endorse 1 episode of nausea without vomiting.    Hematuria  This is a new problem. The current episode started yesterday. Irritative symptoms do not include frequency or urgency. Pertinent negatives include no abdominal pain, chills, dysuria, facial swelling, fever, nausea, vomiting or sore throat.       Constitution: Negative for activity change, appetite change, chills, fatigue, fever, unexpected weight change and generalized weakness.   HENT:  Negative for ear pain, ear discharge, foreign body in ear, tinnitus, hearing loss, dental problem, mouth sores, tongue pain, facial swelling, congestion, postnasal drip, sinus pain, sinus pressure, sore throat, trouble swallowing and voice change.    Neck: Negative for neck pain, neck stiffness and painful lymph nodes.   Cardiovascular:  Negative for chest pain, leg swelling, palpitations and sob on exertion.   Eyes:  Negative for eye trauma, eye discharge, eye itching, eye pain, eye redness, vision loss and eyelid swelling.   Respiratory:  Negative for chest tightness, cough, sputum production, COPD, shortness of breath, wheezing and asthma.    Gastrointestinal:  Negative for abdominal pain, nausea, vomiting, constipation, diarrhea, bright red blood in stool and dark colored stools.   Endocrine: hair loss, cold intolerance and heat intolerance.   Genitourinary:  Positive for hematuria. Negative for dysuria, frequency and urgency.   Musculoskeletal:  Negative for pain, trauma, joint pain, joint swelling, abnormal ROM of " joint and muscle ache.   Skin:  Negative for color change, pale, rash, wound and hives.   Allergic/Immunologic: Negative for environmental allergies, seasonal allergies, food allergies, asthma, hives and itching.   Neurological:  Negative for dizziness, history of vertigo, light-headedness, facial drooping, speech difficulty, headaches, disorientation, altered mental status, loss of consciousness and numbness.   Hematologic/Lymphatic: Negative for swollen lymph nodes and easy bruising/bleeding. Does not bruise/bleed easily.   Psychiatric/Behavioral:  Negative for altered mental status, disorientation, confusion, agitation, sleep disturbance and hallucinations.       Objective:     Physical Exam   Constitutional: She is oriented to person, place, and time. She appears well-developed. She is cooperative.   HENT:   Head: Normocephalic and atraumatic.   Ears:   Right Ear: Hearing, tympanic membrane, external ear and ear canal normal.   Left Ear: Hearing, tympanic membrane, external ear and ear canal normal.   Nose: Nose normal. No mucosal edema or nasal deformity. No epistaxis. Right sinus exhibits no maxillary sinus tenderness and no frontal sinus tenderness. Left sinus exhibits no maxillary sinus tenderness and no frontal sinus tenderness.   Mouth/Throat: Uvula is midline, oropharynx is clear and moist and mucous membranes are normal. No trismus in the jaw. Normal dentition. No uvula swelling.   Eyes: Conjunctivae and lids are normal.   Neck: Trachea normal and phonation normal. Neck supple.   Cardiovascular: Normal rate, regular rhythm, normal heart sounds and normal pulses.   Pulmonary/Chest: Effort normal and breath sounds normal.   Abdominal: Normal appearance and bowel sounds are normal. Soft.   Musculoskeletal: Normal range of motion.         General: Normal range of motion.   Neurological: She is alert and oriented to person, place, and time. She exhibits normal muscle tone.   Skin: Skin is warm, dry and  intact.   Psychiatric: Her speech is normal and behavior is normal. Judgment and thought content normal.   Nursing note and vitals reviewed.      Assessment:     1. Hematuria, unspecified type        Plan:       Hematuria, unspecified type  -     POCT Urinalysis, Dipstick, positive for blood, negative for leukocytes, negative for nitrites, negative for protein  -     Ambulatory referral/consult to Urology    Patient noted to have asia hematuria when collecting urinalysis    Return to clinic for new or worsening symptoms.  Patient is recommended to follow-up with their PCP post discharge.    Total time spent on med rec, H&P, with over half of the time in direct patient care: 36 minutes       Additional MDM:     Heart Failure Score:   COPD = No

## 2023-11-29 NOTE — PATIENT INSTRUCTIONS
Insure adequate fluid intake with electrolytes.    Avoid nephrotoxic drugs as discussed.    Return to clinic for new or worsening symptoms.   chloride 10 mEq/100 mL IVPB (Peripheral Line)  10 mEq Intravenous PRN Gale Atkins MD        magnesium sulfate 1 g in dextrose 5% 100 mL IVPB  1 g Intravenous PRN Gale Atkins MD        polyethylene glycol [facility]) packet 17 g  17 g Oral Daily PRN Gale Atkins MD        acetaminophen (TYLENOL) tablet 500 mg  500 mg Oral Q6H PRN Gale Atkins MD        amLODIPine (NORVASC) tablet 10 mg  10 mg Oral Daily Gale Atkins MD   10 mg at 07/12/19 0930    atorvastatin (LIPITOR) tablet 10 mg  10 mg Oral Daily Gale Atkins MD   10 mg at 07/12/19 0930    bisacodyl (DULCOLAX) suppository 10 mg  10 mg Rectal Daily PRN Gale Atkins MD        calcium carbonate tablet 600 mg  600 mg Oral BID Gale Atkins MD   600 mg at 07/12/19 0930    vitamin D (CHOLECALCIFEROL) capsule 5,000 Units  5,000 Units Oral Daily Gale Atkins MD   5,000 Units at 07/12/19 0930    polyvinyl alcohol (LIQUIFILM TEARS) 1.4 % ophthalmic solution 1 drop  1 drop Both Eyes Q4H PRN Gale Atkins MD   1 drop at 07/12/19 0951    diphenhydrAMINE (BENADRYL) tablet 25 mg  25 mg Oral Nightly PRN Gale Atkins MD        ferrous sulfate tablet 325 mg  325 mg Oral BID Gale Atkins MD   325 mg at 07/12/19 0930    linaclotide (LINZESS) capsule 290 mcg  290 mcg Oral Daily PRN Gale Atkins MD        lisinopril (PRINIVIL;ZESTRIL) tablet 40 mg  40 mg Oral Daily Gale Atkins MD   40 mg at 07/12/19 0930    miconazole (MICOTIN) 2 % powder   Topical BID Gale Atkins MD        montelukast (SINGULAIR) tablet 10 mg  10 mg Oral Nightly Gale Atkins MD   10 mg at 07/11/19 2118    celecoxib (CELEBREX) capsule 200 mg  200 mg Oral BID Gale Atkins MD   200 mg at 07/12/19 0930    nortriptyline (PAMELOR) capsule 50 mg  50 mg Oral Nightly Gale Atkins MD   50 mg at 07/11/19 2118    rivaroxaban (XARELTO) tablet 10 mg  10 mg Oral Q24H Gale Atkins MD   10 mg at 07/12/19 0930    polyethylene glycol (GLYCOLAX) packet 17 g  17 g Oral Daily Cinthya Cazares

## 2023-12-01 ENCOUNTER — TELEPHONE (OUTPATIENT)
Dept: UROLOGY | Facility: CLINIC | Age: 68
End: 2023-12-01
Payer: MEDICARE

## 2023-12-01 NOTE — TELEPHONE ENCOUNTER
Pre appt call   Pt scheduled for gross hematuria  Declines urological past  Informed will need urine upon arrival   Pt vu/confirmed appt time

## 2023-12-02 ENCOUNTER — PATIENT MESSAGE (OUTPATIENT)
Dept: DERMATOLOGY | Facility: CLINIC | Age: 68
End: 2023-12-02
Payer: MEDICARE

## 2023-12-04 ENCOUNTER — OFFICE VISIT (OUTPATIENT)
Dept: UROLOGY | Facility: CLINIC | Age: 68
End: 2023-12-04
Payer: MEDICARE

## 2023-12-04 VITALS
HEIGHT: 67 IN | SYSTOLIC BLOOD PRESSURE: 138 MMHG | BODY MASS INDEX: 37.68 KG/M2 | DIASTOLIC BLOOD PRESSURE: 88 MMHG | WEIGHT: 240.06 LBS | HEART RATE: 73 BPM

## 2023-12-04 DIAGNOSIS — R31.0 GROSS HEMATURIA: Primary | ICD-10-CM

## 2023-12-04 LAB
BILIRUBIN, UA POC OHS: NEGATIVE
BLOOD, UA POC OHS: ABNORMAL
CLARITY, UA POC OHS: CLEAR
COLOR, UA POC OHS: YELLOW
GLUCOSE, UA POC OHS: NEGATIVE
KETONES, UA POC OHS: NEGATIVE
LEUKOCYTES, UA POC OHS: NEGATIVE
NITRITE, UA POC OHS: NEGATIVE
PH, UA POC OHS: 6
PROTEIN, UA POC OHS: NEGATIVE
SPECIFIC GRAVITY, UA POC OHS: 1.01
UROBILINOGEN, UA POC OHS: 0.2

## 2023-12-04 PROCEDURE — 99213 OFFICE O/P EST LOW 20 MIN: CPT | Mod: PBBFAC,PO | Performed by: UROLOGY

## 2023-12-04 PROCEDURE — 99205 PR OFFICE/OUTPT VISIT, NEW, LEVL V, 60-74 MIN: ICD-10-PCS | Mod: S$PBB,,, | Performed by: UROLOGY

## 2023-12-04 PROCEDURE — 81003 URINALYSIS AUTO W/O SCOPE: CPT | Mod: PBBFAC,PO | Performed by: UROLOGY

## 2023-12-04 PROCEDURE — 99205 OFFICE O/P NEW HI 60 MIN: CPT | Mod: S$PBB,,, | Performed by: UROLOGY

## 2023-12-04 PROCEDURE — 99999PBSHW POCT URINALYSIS(INSTRUMENT): Mod: PBBFAC,,,

## 2023-12-04 PROCEDURE — 88112 PR  CYTOPATH, CELL ENHANCE TECH: ICD-10-PCS | Mod: 26,,, | Performed by: PATHOLOGY

## 2023-12-04 PROCEDURE — 99999 PR PBB SHADOW E&M-EST. PATIENT-LVL III: ICD-10-PCS | Mod: PBBFAC,,, | Performed by: UROLOGY

## 2023-12-04 PROCEDURE — 99999 PR PBB SHADOW E&M-EST. PATIENT-LVL III: CPT | Mod: PBBFAC,,, | Performed by: UROLOGY

## 2023-12-04 PROCEDURE — 99999PBSHW POCT URINALYSIS(INSTRUMENT): ICD-10-PCS | Mod: PBBFAC,,,

## 2023-12-04 PROCEDURE — 88112 CYTOPATH CELL ENHANCE TECH: CPT | Mod: 26,,, | Performed by: PATHOLOGY

## 2023-12-04 PROCEDURE — 88112 CYTOPATH CELL ENHANCE TECH: CPT | Performed by: PATHOLOGY

## 2023-12-04 NOTE — PROGRESS NOTES
Procedure Order to Urology [[id number]]    Electronically signed by: Winston Chand MD on 12/04/23 1428 Status: Active   Ordering user: Winston Chand MD 12/04/23 1428 Authorized by: Winston Chand MD   Ordering mode: Standard   Frequency:  12/04/23 -     Diagnoses  Gross hematuria [R31.0]   Questionnaire    Question Answer   Procedure Cystoscopy Comment - 12/12   Facility Name: [facility], Please order Urine POCT without micro . Local sedation

## 2023-12-04 NOTE — PROGRESS NOTES
[facility] Urology New Patient/H&P:     Marylou Bender is a 68 y.o. female who presents for evaluation of gross hematuria    Had presentation 11/28/23 to urgent care for hematuria noting  Patient with past medical history of CKD 3 presents to clinic with complaints of asia hematuria that began last night.  Denies abdominal pain.  Denies burning with urination.  Does endorse 1 episode of nausea without vomiting. Denies urgency, frequency, abdominal pain  Udip blood positive    She reports that last Tuesday after an active day of lifting and climbing ladders she noted a twinge in her back twice and then when she sat down had some pressure and when voiding had some dark and floating elements and then saw blood on her pad.  She has some discomfort and took 3 Pepcid, two Tylenol, and had mostly had coffee to drink with no water.  Urine the next morning was pink with clots at which time she presented to urgent care.  On arrival to urgent Care her urine was yellow even though her urinalysis dipstick was positive for blood as above, however she did void 1 more time before leaving her urgent care visit which had asia blood and had the visualize it.  Since then her urine has been yellow, had some tiny floaters within it but has otherwise cleared.    She has no personal or family history of kidney stones.  No known family history of  malignancy.  However her mother had CKD-4  She is a non/never smoker  She had a hysterectomy with appendectomy at age 38 from endometriosis noting significant scarring and was advised she may need resection or follow-up in the future, but has had no issues.  No blood thinners.  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  She is utilize steroids, antibiotics, mouthwash.  Despite reported history of CKD 3, her last renal function on file from May of 2023 is normal with creatinine of 1.1 and EGFR greater than  60, however three-month prior did note an EGFR 55.1    .     Past Medical History:   Diagnosis Date    CKD (chronic kidney disease) stage 3, GFR 30-59 ml/min     Diverticulitis     Fluttering heart     PTSD (post-traumatic stress disorder)     Stress incontinence (female)        Past Surgical History:   Procedure Laterality Date    APPENDECTOMY      BUNIONECTOMY Left      SECTION      COLONOSCOPY W/ POLYPECTOMY  2023    COLON REPORT   5YR RECALL    [facility]    HYSTERECTOMY      TONSILLECTOMY         Family History   Problem Relation Age of Onset    Cancer Mother         colon    Thyroid disease Mother     Kidney disease Mother     Heart disease Mother     Stroke Mother     Diabetes Mother     Heart disease Father     Thyroid disease Sister     Cancer Maternal Grandmother 62        Colon       Social History     Socioeconomic History    Marital status:     Number of children: 2   Occupational History    Occupation: Retired     Comment:    Tobacco Use    Smoking status: Never    Smokeless tobacco: Never   Substance and Sexual Activity    Alcohol use: Yes     Comment: rarely    Drug use: Never    Sexual activity: Yes     Partners: Male     Social Determinants of Health     Financial Resource Strain: Low Risk  (2023)    Overall Financial Resource Strain (CARDIA)     Difficulty of Paying Living Expenses: Not very hard   Food Insecurity: No Food Insecurity (2023)    Hunger Vital Sign     Worried About Running Out of Food in the Last Year: Never true     Ran Out of Food in the Last Year: Never true   Transportation Needs: No Transportation Needs (2023)    PRAPARE - Transportation     Lack of Transportation (Medical): No     Lack of Transportation (Non-Medical): No   Physical Activity: Inactive (2023)    Exercise Vital Sign     Days of Exercise per Week: 0 days     Minutes of Exercise per Session: 0 min   Stress: Stress Concern Present  "(11/28/2023)    [place] [employer] - Occupational Stress Questionnaire     Feeling of Stress : Very much   Social Connections: Unknown (11/28/2023)    Social Connection and Isolation Panel [NHANES]     Frequency of Communication with Friends and Family: More than three times a week     Frequency of Social Gatherings with Friends and Family: More than three times a week     Active Member of Clubs or Organizations: Patient refused     Attends Club or Organization Meetings: Patient refused     Marital Status:    Housing Stability: Low Risk  (11/28/2023)    Housing Stability Vital Sign     Unable to Pay for Housing in the Last Year: No     Number of Places Lived in the Last Year: 2     Unstable Housing in the Last Year: No       Review of patient's allergies indicates:   Allergen Reactions    Nsaids (non-steroidal anti-inflammatory drug)        Medications Reviewed: see MAR    Focused Physical Exam    Vitals:    12/04/23 1338   BP: 138/88   Pulse: 73     Body mass index is 37.6 kg/m². Weight: 108.9 kg (240 lb 1.3 oz) Height: 5' 7" (170.2 cm)       Abdomen: Soft, non-tender, nondistended, no CVA tenderness    LABS:    Recent Results (from the past 336 hour(s))   POCT Urinalysis, Dipstick, Automated, W/O Scope    Collection Time: 11/28/23  6:21 PM   Result Value Ref Range    POC Blood, Urine Positive (A) Negative    POC Bilirubin, Urine Negative Negative    POC Urobilinogen, Urine 0.2 0.1 - 1.1    POC Ketones, Urine Negative Negative    POC Protein, Urine Negative Negative    POC Nitrates, Urine Negative Negative    POC Glucose, Urine Negative Negative    pH, UA 6.0     POC Specific Gravity, Urine 1.010 1.003 - 1.029    POC Leukocytes, Urine Negative Negative   POCT Urinalysis(Instrument)    Collection Time: 12/04/23  1:56 PM   Result Value Ref Range    Color, POC UA Yellow Yellow, Straw, Colorless    Clarity, POC UA Clear Clear    Glucose, POC UA Negative Negative    Bilirubin, POC UA " Negative Negative    Ketones, POC UA Negative Negative    Spec Grav POC UA 1.010 1.005 - 1.030    Blood, POC UA Trace-intact (A) Negative    pH, POC UA 6.0 5.0 - 8.0    Protein, POC UA Negative Negative    Urobilinogen, POC UA 0.2 <=1.0    Nitrite, POC UA Negative Negative    WBC, POC UA Negative Negative         Assessment/Diagnosis:    1. Gross hematuria  POCT Urinalysis(Instrument)    Cytology, Urine    Procedure Order to Urology    CT Urogram Abd Pelvis W WO    Creatinine, Serum    Urine culture          Plans:  Extensive review of medical record including recent workup evaluation and management by urgent care for her gross hematuria.  Has resolved since that time but urine still demonstrates trace blood.  Non/never smoker, no significant risk factors.  We did review that the workup for gross hematuria, isolated singular, or recurrent, is the same.  Urine studies to include urine culture to rule out infection, of which I am not suspicious of as he has no lower tract symptoms and urine has only been positive for blood, no leukocytes or nitrites, as well as urine cytology.  CT urogram for upper tract evaluation noting a non contrasted phase will rule in or out any kidney stones.  There is some suspicion of this given her abdominal pressure pain debris in urine prior to gross hematuria, however no known history, and unlikely to be recurrent lower tract bleeding with clots.  We discussed the need for cystoscopic evaluation for lower tract and bladder evaluation to rule out any lower tract source as well.  Procedure in detail diagnostic flexible cystourethroscopy was reviewed in detail including the use of local anesthesia with xylocaine jelly, all questions answered, and patient agreeable to proceed.  Cystoscopy was scheduled on 12/12/23 at the [facility] per patient request, and CT urogram with creatinine on arrival to be scheduled prior.  I did review the noted CKD 3 in her chart however her last renal function on file  was quite normal not indicative of chronic kidney disease, though the 1 prior was.  Even such at that value, CT urogram contrast would likely be acceptable and advise patient to remain well hydrated before and after.  Will chart check results of CT urogram and contact patient if any management changes otherwise will see patient for cystoscopy as planned to complete workup and discuss further plans for follow-up    Total time spent in/on encounter today, including face to face time with patient, counseling, medical record review, interpretation of tests/results, , and treatment plan coordination: 60 minutes

## 2023-12-04 NOTE — H&P (VIEW-ONLY)
[facility] Urology New Patient/H&P:     Marylou Bender is a 68 y.o. female who presents for evaluation of gross hematuria    Had presentation 11/28/23 to urgent care for hematuria noting  Patient with past medical history of CKD 3 presents to clinic with complaints of asia hematuria that began last night.  Denies abdominal pain.  Denies burning with urination.  Does endorse 1 episode of nausea without vomiting. Denies urgency, frequency, abdominal pain  Udip blood positive    She reports that last Tuesday after an active day of lifting and climbing ladders she noted a twinge in her back twice and then when she sat down had some pressure and when voiding had some dark and floating elements and then saw blood on her pad.  She has some discomfort and took 3 Pepcid, two Tylenol, and had mostly had coffee to drink with no water.  Urine the next morning was pink with clots at which time she presented to urgent care.  On arrival to urgent Care her urine was yellow even though her urinalysis dipstick was positive for blood as above, however she did void 1 more time before leaving her urgent care visit which had asia blood and had the visualize it.  Since then her urine has been yellow, had some tiny floaters within it but has otherwise cleared.    She has no personal or family history of kidney stones.  No known family history of  malignancy.  However her mother had CKD-4  She is a non/never smoker  She had a hysterectomy with appendectomy at age 38 from endometriosis noting significant scarring and was advised she may need resection or follow-up in the future, but has had no issues.  No blood thinners.  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  She is utilize steroids, antibiotics, mouthwash.  Despite reported history of CKD 3, her last renal function on file from May of 2023 is normal with creatinine of 1.1 and EGFR greater than  60, however three-month prior did note an EGFR 55.1    .     Past Medical History:   Diagnosis Date    CKD (chronic kidney disease) stage 3, GFR 30-59 ml/min     Diverticulitis     Fluttering heart     PTSD (post-traumatic stress disorder)     Stress incontinence (female)        Past Surgical History:   Procedure Laterality Date    APPENDECTOMY      BUNIONECTOMY Left      SECTION      COLONOSCOPY W/ POLYPECTOMY  2023    COLON REPORT   5YR RECALL    [facility]    HYSTERECTOMY      TONSILLECTOMY         Family History   Problem Relation Age of Onset    Cancer Mother         colon    Thyroid disease Mother     Kidney disease Mother     Heart disease Mother     Stroke Mother     Diabetes Mother     Heart disease Father     Thyroid disease Sister     Cancer Maternal Grandmother 62        Colon       Social History     Socioeconomic History    Marital status:     Number of children: 2   Occupational History    Occupation: Retired     Comment:    Tobacco Use    Smoking status: Never    Smokeless tobacco: Never   Substance and Sexual Activity    Alcohol use: Yes     Comment: rarely    Drug use: Never    Sexual activity: Yes     Partners: Male     Social Determinants of Health     Financial Resource Strain: Low Risk  (2023)    Overall Financial Resource Strain (CARDIA)     Difficulty of Paying Living Expenses: Not very hard   Food Insecurity: No Food Insecurity (2023)    Hunger Vital Sign     Worried About Running Out of Food in the Last Year: Never true     Ran Out of Food in the Last Year: Never true   Transportation Needs: No Transportation Needs (2023)    PRAPARE - Transportation     Lack of Transportation (Medical): No     Lack of Transportation (Non-Medical): No   Physical Activity: Inactive (2023)    Exercise Vital Sign     Days of Exercise per Week: 0 days     Minutes of Exercise per Session: 0 min   Stress: Stress Concern Present  "(11/28/2023)    [place] of Occupational Health - Occupational Stress Questionnaire     Feeling of Stress : Very much   Social Connections: Unknown (11/28/2023)    Social Connection and Isolation Panel [NHANES]     Frequency of Communication with Friends and Family: More than three times a week     Frequency of Social Gatherings with Friends and Family: More than three times a week     Active Member of Clubs or Organizations: Patient refused     Attends Club or Organization Meetings: Patient refused     Marital Status:    Housing Stability: Low Risk  (11/28/2023)    Housing Stability Vital Sign     Unable to Pay for Housing in the Last Year: No     Number of Places Lived in the Last Year: 2     Unstable Housing in the Last Year: No       Review of patient's allergies indicates:   Allergen Reactions    Nsaids (non-steroidal anti-inflammatory drug)        Medications Reviewed: see MAR    Focused Physical Exam    Vitals:    12/04/23 1338   BP: 138/88   Pulse: 73     Body mass index is 37.6 kg/m². Weight: 108.9 kg (240 lb 1.3 oz) Height: 5' 7" (170.2 cm)       Abdomen: Soft, non-tender, nondistended, no CVA tenderness    LABS:    Recent Results (from the past 336 hour(s))   POCT Urinalysis, Dipstick, Automated, W/O Scope    Collection Time: 11/28/23  6:21 PM   Result Value Ref Range    POC Blood, Urine Positive (A) Negative    POC Bilirubin, Urine Negative Negative    POC Urobilinogen, Urine 0.2 0.1 - 1.1    POC Ketones, Urine Negative Negative    POC Protein, Urine Negative Negative    POC Nitrates, Urine Negative Negative    POC Glucose, Urine Negative Negative    pH, UA 6.0     POC Specific Gravity, Urine 1.010 1.003 - 1.029    POC Leukocytes, Urine Negative Negative   POCT Urinalysis(Instrument)    Collection Time: 12/04/23  1:56 PM   Result Value Ref Range    Color, POC UA Yellow Yellow, Straw, Colorless    Clarity, POC UA Clear Clear    Glucose, POC UA Negative Negative    Bilirubin, POC UA " Negative Negative    Ketones, POC UA Negative Negative    Spec Grav POC UA 1.010 1.005 - 1.030    Blood, POC UA Trace-intact (A) Negative    pH, POC UA 6.0 5.0 - 8.0    Protein, POC UA Negative Negative    Urobilinogen, POC UA 0.2 <=1.0    Nitrite, POC UA Negative Negative    WBC, POC UA Negative Negative         Assessment/Diagnosis:    1. Gross hematuria  POCT Urinalysis(Instrument)    Cytology, Urine    Procedure Order to Urology    CT Urogram Abd Pelvis W WO    Creatinine, Serum    Urine culture          Plans:  Extensive review of medical record including recent workup evaluation and management by urgent care for her gross hematuria.  Has resolved since that time but urine still demonstrates trace blood.  Non/never smoker, no significant risk factors.  We did review that the workup for gross hematuria, isolated singular, or recurrent, is the same.  Urine studies to include urine culture to rule out infection, of which I am not suspicious of as he has no lower tract symptoms and urine has only been positive for blood, no leukocytes or nitrites, as well as urine cytology.  CT urogram for upper tract evaluation noting a non contrasted phase will rule in or out any kidney stones.  There is some suspicion of this given her abdominal pressure pain debris in urine prior to gross hematuria, however no known history, and unlikely to be recurrent lower tract bleeding with clots.  We discussed the need for cystoscopic evaluation for lower tract and bladder evaluation to rule out any lower tract source as well.  Procedure in detail diagnostic flexible cystourethroscopy was reviewed in detail including the use of local anesthesia with xylocaine jelly, all questions answered, and patient agreeable to proceed.  Cystoscopy was scheduled on 12/12/23 at the [facility] per patient request, and CT urogram with creatinine on arrival to be scheduled prior.  I did review the noted CKD 3 in her chart however her last renal function on file  was quite normal not indicative of chronic kidney disease, though the 1 prior was.  Even such at that value, CT urogram contrast would likely be acceptable and advise patient to remain well hydrated before and after.  Will chart check results of CT urogram and contact patient if any management changes otherwise will see patient for cystoscopy as planned to complete workup and discuss further plans for follow-up    Total time spent in/on encounter today, including face to face time with patient, counseling, medical record review, interpretation of tests/results, , and treatment plan coordination: 60 minutes

## 2023-12-05 LAB
FINAL PATHOLOGIC DIAGNOSIS: NORMAL
Lab: NORMAL

## 2023-12-08 ENCOUNTER — TELEPHONE (OUTPATIENT)
Dept: UROLOGY | Facility: CLINIC | Age: 68
End: 2023-12-08
Payer: MEDICARE

## 2023-12-08 ENCOUNTER — OFFICE VISIT (OUTPATIENT)
Dept: ORTHOPEDICS | Facility: CLINIC | Age: 68
End: 2023-12-08
Payer: MEDICARE

## 2023-12-08 VITALS — DIASTOLIC BLOOD PRESSURE: 78 MMHG | SYSTOLIC BLOOD PRESSURE: 130 MMHG

## 2023-12-08 DIAGNOSIS — M75.42 IMPINGEMENT SYNDROME OF SHOULDER, LEFT: Primary | ICD-10-CM

## 2023-12-08 DIAGNOSIS — M75.112 NONTRAUMATIC INCOMPLETE TEAR OF LEFT ROTATOR CUFF: ICD-10-CM

## 2023-12-08 PROCEDURE — 20610 DRAIN/INJ JOINT/BURSA W/O US: CPT | Mod: LT,S$GLB,, | Performed by: PHYSICIAN ASSISTANT

## 2023-12-08 PROCEDURE — 99213 OFFICE O/P EST LOW 20 MIN: CPT | Mod: 25,S$GLB,, | Performed by: PHYSICIAN ASSISTANT

## 2023-12-08 PROCEDURE — 20610 LARGE JOINT ASPIRATION/INJECTION: L SUBACROMIAL BURSA: ICD-10-PCS | Mod: LT,S$GLB,, | Performed by: PHYSICIAN ASSISTANT

## 2023-12-08 PROCEDURE — 99213 PR OFFICE/OUTPT VISIT, EST, LEVL III, 20-29 MIN: ICD-10-PCS | Mod: 25,S$GLB,, | Performed by: PHYSICIAN ASSISTANT

## 2023-12-08 RX ORDER — TRIAMCINOLONE ACETONIDE 40 MG/ML
40 INJECTION, SUSPENSION INTRA-ARTICULAR; INTRAMUSCULAR
Status: DISCONTINUED | OUTPATIENT
Start: 2023-12-08 | End: 2023-12-08 | Stop reason: HOSPADM

## 2023-12-08 RX ADMIN — TRIAMCINOLONE ACETONIDE 40 MG: 40 INJECTION, SUSPENSION INTRA-ARTICULAR; INTRAMUSCULAR at 10:12

## 2023-12-08 NOTE — DISCHARGE INSTRUCTIONS
Cystoscopy    Cystoscopy is a procedure that lets your doctor look directly inside your urethra and bladder. It can be used to:  Help diagnose a problem with your urethra, bladder, or kidneys.  Take a sample (biopsy) of bladder or urethral tissue.  Treat certain problems (such as removing kidney stones).  Place a stent to bypass an obstruction.  Take special X-rays of the kidneys.  Based on the findings, your doctor may recommend other tests or treatments.  What is a cystoscope?  A cystoscope is a telescope-like instrument that contains lenses and fiberoptics (small glass wires that make bright light). The cystoscope may be straight and rigid, or flexible to bend around curves in the urethra. The doctor may look directly into the cystoscope, or project the image onto a monitor.  Getting ready  Ask your doctor if you should stop taking any medicines before the procedure.  Ask whether you should avoid eating or drinking anything after midnight before the procedure.  Follow any other instructions your doctor gives you.  Tell your doctor before the exam if you:  Take any medicines, such as aspirin or blood thinners  Have allergies to any medicines  Are pregnant   The procedure  Cystoscopy is done in the doctors office, surgery center, or hospital. The doctor and a nurse are present during the procedure. It takes only a few minutes, longer if a biopsy, X-ray, or treatment needs to be done.  During the procedure:  You lie on an exam table on your back, knees bent and legs apart. You are covered with a drape.  Your urethra and the area around it are washed. Anesthetic jelly may be applied to numb the urethra. Other pain medicine is usually not needed. In some cases, you may be offered a mild sedative to help you relax. If a more extensive procedure is to be done, such as a biopsy or kidney stone removal, general anesthesia may be needed.  The cystoscope is inserted. A sterile fluid is put into the bladder to expand it.  You may feel pressure from this fluid.  When the procedure is done, the cystoscope is removed.  After the procedure  If you had a sedative, general anesthesia, or spinal anesthesia, you must have someone drive you home. Once youre home:  Drink plenty of fluids.  You may have burning or light bleeding when you urinate--this is normal.  Medicines may be prescribed to ease any discomfort or prevent infection. Take these as directed.  Call your doctor if you have heavy bleeding or blood clots, burning that lasts more than a day, a fever over 100°F  (38° C), or trouble urinating.    After Surgery:  Always be aware that any surgery can cause these symptoms:    Pain- Medication can be prescribed for pain to decrease your pain but may not completely take your pain away.  Over the Counter pain medicine my be enough and you can always use Ice and rest to help ease pain.    Bleeding- a little bleeding after a surgery is usually within normal.  If there is a lot of blood you need to notify your MD.  Emergency treatments of bleeding are cold application, elevation of the bleeding site and compression.    Infection- Infection after surgery is NOT a normal occurrence.  Signs of infection are fever, swelling, hot to touch the incision.  If this occurs notify your MD immediately.    Nausea- this can be common after a surgery especially if you have had anesthesia medicine or are taking pain medicine.  Staying on clear liquids, bland foods, gingerale, or over the counter anti nausea medicines can help.  If you vomit more than once, notify your MD.  Anti Nausea medicines can be prescribed.

## 2023-12-08 NOTE — TELEPHONE ENCOUNTER
----- Message from Vanessa Mckeon sent at 12/8/2023  8:30 AM CST -----  Name of Who is Calling:TASHIA YBARRA [[id number]]        What is the request in detail:Pt would like a callback for arrival time and instructions from upcoming procedure on 12/12.Please advise thank you       Can the clinic reply by [facility]:NO        What Number to Call Back if not in [employer]:.Telephone Information:  Mobile          [phone]

## 2023-12-08 NOTE — TELEPHONE ENCOUNTER
Spoke w pt she was informed will receive call today or Monday regarding time for procedure   Pt vu    oral

## 2023-12-08 NOTE — PROGRESS NOTES
[facility] ORTHOPEDICS  [address]  [place] [address]  Phone: [phone]   [phone]    Patient's PCP: Bj Grimm III, MD  Referring Provider: No ref. provider found    Subjective:      Chief Complaint:   Chief Complaint   Patient presents with    Left Shoulder - Pain     Last injected 2023, got relief until October, requesting another injection       Past Medical History:   Diagnosis Date    CKD (chronic kidney disease) stage 3, GFR 30-59 ml/min     Diverticulitis     Fluttering heart     PTSD (post-traumatic stress disorder)     Stress incontinence (female)        Past Surgical History:   Procedure Laterality Date    APPENDECTOMY      BUNIONECTOMY Left      SECTION      COLONOSCOPY W/ POLYPECTOMY  2023    COLON REPORT   5YR RECALL    [facility]    HYSTERECTOMY      TONSILLECTOMY         Current Outpatient Medications   Medication Sig    ALPRAZolam (XANAX) 0.5 MG tablet Take 1 tablet (0.5 mg total) by mouth 3 (three) times daily as needed for Anxiety.    doxycycline 50 MG capsule Take 1 capsule (50 mg total) by mouth 2 (two) times daily.    sertraline (ZOLOFT) 100 MG tablet TAKE 1 & 1/2 (ONE & ONE-HALF) TABLETS BY MOUTH ONCE DAILY    traZODone (DESYREL) 50 MG tablet Take 1 tablet (50 mg total) by mouth every evening.    amitriptyline (ELAVIL) 25 MG tablet Take 1 tablet (25 mg total) by mouth nightly. (Patient not taking: Reported on 10/17/2023)    amoxicillin-clavulanate 875-125mg (AUGMENTIN) 875-125 mg per tablet Take 1 tablet by mouth every 12 (twelve) hours. (Patient not taking: Reported on 2023)     No current facility-administered medications for this visit.       Review of patient's allergies indicates:   Allergen Reactions    Nsaids (non-steroidal anti-inflammatory drug)        Family History   Problem Relation Age of Onset    Cancer Mother         colon    Thyroid disease Mother     Kidney disease Mother     Heart disease Mother     Stroke  Mother     Diabetes Mother     Heart disease Father     Thyroid disease Sister     Cancer Maternal Grandmother 62        Colon       Social History     Socioeconomic History    Marital status:     Number of children: 2   Occupational History    Occupation: Retired     Comment:    Tobacco Use    Smoking status: Never    Smokeless tobacco: Never   Substance and Sexual Activity    Alcohol use: Yes     Comment: rarely    Drug use: Never    Sexual activity: Yes     Partners: Male     Social Determinants of Health     Financial Resource Strain: Low Risk  (11/28/2023)    Overall Financial Resource Strain (CARDIA)     Difficulty of Paying Living Expenses: Not very hard   Food Insecurity: No Food Insecurity (11/28/2023)    Hunger Vital Sign     Worried About Running Out of Food in the Last Year: Never true     Ran Out of Food in the Last Year: Never true   Transportation Needs: No Transportation Needs (11/28/2023)    PRAPARE - Transportation     Lack of Transportation (Medical): No     Lack of Transportation (Non-Medical): No   Physical Activity: Inactive (11/28/2023)    Exercise Vital Sign     Days of Exercise per Week: 0 days     Minutes of Exercise per Session: 0 min   Stress: Stress Concern Present (11/28/2023)    [employer] [place] [employer] - Occupational Stress Questionnaire     Feeling of Stress : Very much   Social Connections: Unknown (11/28/2023)    Social Connection and Isolation Panel [NHANES]     Frequency of Communication with Friends and Family: More than three times a week     Frequency of Social Gatherings with Friends and Family: More than three times a week     Active Member of Clubs or Organizations: Patient refused     Attends Club or Organization Meetings: Patient refused     Marital Status:    Housing Stability: Low Risk  (11/28/2023)    Housing Stability Vital Sign     Unable to Pay for Housing in the Last Year: No     Number of Places Lived in the Last  Year: 2     Unstable Housing in the Last Year: No       Prior to meeting with the patient I reviewed the medical chart in [facility]. This included reviewing the previous progress notes from our office, review of the patient's last appointment with their primary care provider, review of any visits to the emergency room, and review of any pain management appointments or procedures.    History of present illness:  68-year-old female, presents to clinic today to follow-up on the left shoulder.  We saw her in February of this year, she would had some chronic shoulder pain for about a year prior to that in the fall.  She got relief with the subacromial steroid injection administered at that time.  She comes down from [facility] visit.  She states she has a difficult time getting to see doctors in [place] that is why she comes here when she visits her family and grandson.  Her 's ill as well, she does have some other issues going on so she is not in a great place to get an MRI but we did discuss that today.      Review of Systems:    Constitutional: Negative for chills, fever and weight loss.   HENT: Negative for congestion.    Eyes: Negative for discharge and redness.   Respiratory: Negative for cough and shortness of breath.    Cardiovascular: Negative for chest pain.   Gastrointestinal: Negative for nausea and vomiting.   Musculoskeletal: See HPI.   Skin: Negative for rash.   Neurological: Negative for headaches.   Endo/Heme/Allergies: Does not bruise/bleed easily.   Psychiatric/Behavioral: The patient is not nervous/anxious.    All other systems reviewed and are negative.       Objective:      Physical Examination:    Vital Signs:    Vitals:    12/08/23 1025   BP: 130/78       There is no height or weight on file to calculate BMI.    This a well-developed, well nourished patient in no acute distress.  They are alert and oriented and cooperative to examination.     Examination of the left shoulder, the skin  is dry and intact, no erythema or ecchymosis, no signs symptoms of infection.  Range of motion is full in all planes 180° of forward flexion 90° external rotation, internal rotation to the lumbar sacral spine.  Mild discomfort with impingement test as well as some mild pain and weakness with Aleksander's test.    Pertinent New Results:    AP and lateral views left shoulder taken today in the office demonstrate no significant glenohumeral joint changes.  Type 2 acromion process.    XRAY Report / Interpretation:         Assessment:       1. Impingement syndrome of shoulder, left    2. Nontraumatic incomplete tear of left rotator cuff      Plan:     Impingement syndrome of shoulder, left  -     X-Ray Shoulder 2 or More Views Left  -     Large Joint Aspiration/Injection: L subacromial bursa    Nontraumatic incomplete tear of left rotator cuff  -     Large Joint Aspiration/Injection: L subacromial bursa        Follow up if symptoms worsen or fail to improve.    68-year-old female with recurrent left shoulder pain.  Symptoms suggestive of subacromial bursitis or impingement, however more than likely she does have some underlying component of a degenerative rotator cuff tear.  We discussed the risks and benefits of repeat steroid injections in the left rotator cuff and I encouraged her to consider an MRI.  Not a great time for her to have that test done as her 's ill as well in their going through a myriad of other tests.  We reinjected the left shoulder today, she will follow-up with us in a couple of months and at that time she anticipates being ready to proceed with an MRI.      Nixon Hernandez, ALTON, PA-C    This note was created using [employer] voice recognition software that occasionally misinterprets words or phrases.

## 2023-12-08 NOTE — PROCEDURES
Large Joint Aspiration/Injection: L subacromial bursa    Date/Time: 12/8/2023 10:30 AM    Performed by: Nixon Hernandez PA  Authorized by: Nixon Hernandez PA    Consent Done?:  Yes (Verbal)  Indications:  Pain  Site marked: the procedure site was marked    Timeout: prior to procedure the correct patient, procedure, and site was verified    Prep: patient was prepped and draped in usual sterile fashion      Local anesthesia used?: Yes    Local anesthetic:  Lidocaine 1% without epinephrine    Details:  Needle Size:  25 G  Ultrasonic Guidance for needle placement?: No    Location:  Shoulder  Site:  L subacromial bursa  Medications:  40 mg triamcinolone acetonide 40 mg/mL  Patient tolerance:  Patient tolerated the procedure well with no immediate complications

## 2023-12-09 ENCOUNTER — HOSPITAL ENCOUNTER (OUTPATIENT)
Dept: RADIOLOGY | Facility: HOSPITAL | Age: 68
Discharge: HOME | End: 2023-12-09
Attending: UROLOGY
Payer: MEDICARE

## 2023-12-09 DIAGNOSIS — R31.0 GROSS HEMATURIA: ICD-10-CM

## 2023-12-09 PROCEDURE — 74178 CT ABD&PLV WO CNTR FLWD CNTR: CPT | Mod: TC

## 2023-12-09 PROCEDURE — 74178 CT ABD&PLV WO CNTR FLWD CNTR: CPT | Mod: 26,,, | Performed by: RADIOLOGY

## 2023-12-09 PROCEDURE — 74178 CT UROGRAM ABD PELVIS W WO: ICD-10-PCS | Mod: 26,,, | Performed by: RADIOLOGY

## 2023-12-09 PROCEDURE — 25500020 PHARM REV CODE 255

## 2023-12-09 RX ADMIN — IOHEXOL 125 ML: 350 INJECTION, SOLUTION INTRAVENOUS at 12:12

## 2023-12-12 ENCOUNTER — HOSPITAL ENCOUNTER (OUTPATIENT)
Facility: HOSPITAL | Age: 68
Discharge: HOME | End: 2023-12-12
Attending: UROLOGY | Admitting: UROLOGY
Payer: MEDICARE

## 2023-12-12 VITALS
OXYGEN SATURATION: 97 % | BODY MASS INDEX: 37.67 KG/M2 | HEART RATE: 80 BPM | HEIGHT: 67 IN | SYSTOLIC BLOOD PRESSURE: 181 MMHG | WEIGHT: 240 LBS | RESPIRATION RATE: 18 BRPM | DIASTOLIC BLOOD PRESSURE: 101 MMHG | TEMPERATURE: 98 F

## 2023-12-12 DIAGNOSIS — R31.0 GROSS HEMATURIA: ICD-10-CM

## 2023-12-12 DIAGNOSIS — N28.89 OTHER SPECIFIED DISORDERS OF KIDNEY AND URETER: Primary | ICD-10-CM

## 2023-12-12 DIAGNOSIS — N28.89 RENAL MASS, LEFT: Primary | ICD-10-CM

## 2023-12-12 LAB
BILIRUBIN, UA POC OHS: NEGATIVE
BLOOD, UA POC OHS: ABNORMAL
CLARITY, UA POC OHS: CLEAR
COLOR, UA POC OHS: YELLOW
GLUCOSE, UA POC OHS: NEGATIVE
KETONES, UA POC OHS: NEGATIVE
LEUKOCYTES, UA POC OHS: NEGATIVE
NITRITE, UA POC OHS: NEGATIVE
PH, UA POC OHS: 7
PROTEIN, UA POC OHS: NEGATIVE
SPECIFIC GRAVITY, UA POC OHS: 1.02
UROBILINOGEN, UA POC OHS: 0.2

## 2023-12-12 PROCEDURE — 52000 CYSTOURETHROSCOPY: CPT | Performed by: UROLOGY

## 2023-12-12 PROCEDURE — A4217 STERILE WATER/SALINE, 500 ML: HCPCS | Performed by: UROLOGY

## 2023-12-12 PROCEDURE — 52000 PR CYSTOURETHROSCOPY: ICD-10-PCS | Mod: ,,, | Performed by: UROLOGY

## 2023-12-12 PROCEDURE — 87086 URINE CULTURE/COLONY COUNT: CPT | Performed by: UROLOGY

## 2023-12-12 PROCEDURE — 52000 CYSTOURETHROSCOPY: CPT | Mod: ,,, | Performed by: UROLOGY

## 2023-12-12 PROCEDURE — 25000003 PHARM REV CODE 250: Performed by: UROLOGY

## 2023-12-12 RX ORDER — WATER 1 ML/ML
IRRIGANT IRRIGATION
Status: DISCONTINUED | OUTPATIENT
Start: 2023-12-12 | End: 2023-12-12 | Stop reason: HOSPADM

## 2023-12-12 RX ORDER — LIDOCAINE HYDROCHLORIDE 20 MG/ML
JELLY TOPICAL
Status: DISCONTINUED | OUTPATIENT
Start: 2023-12-12 | End: 2023-12-12 | Stop reason: HOSPADM

## 2023-12-12 RX ORDER — CEFAZOLIN SODIUM 2 G/50ML
2 SOLUTION INTRAVENOUS
Status: CANCELLED | OUTPATIENT
Start: 2023-12-12

## 2023-12-12 RX ORDER — CIPROFLOXACIN 500 MG/1
500 TABLET ORAL 2 TIMES DAILY
Qty: 4 TABLET | Refills: 0 | Status: SHIPPED | OUTPATIENT
Start: 2023-12-12 | End: 2023-12-14

## 2023-12-12 NOTE — PLAN OF CARE
Discharge instructions given to pt//dtr, verbalized understanding.  Refused fluids.  Denies pain.  Prescription sent to pharmacy per dr ochoa.  Ambulating out with family in no distress.

## 2023-12-12 NOTE — INTERVAL H&P NOTE
The patient has been examined and the H&P has been reviewed:    Cytol neg  Udip today trc blood only  Ct  There is a slightly irregular prominent heterogeneous enhancing soft tissue attenuation mass extending exophytically off the inferior pole of the left kidney measuring 6.1 cm AP x 5.5 cm transverse by 6.3 cm cc.  This is suspicious for renal neoplasia.  The mass extends into left renal hilum with fusiform enlargement of the distal left renal vein suspicious for thrombosis.  There is mild left perinephric inflammatory change.  There are no renal calculi.  Right kidney enhances homogeneously.  No changes of hydronephrosis.  The ureters are normal in course and caliber.     Air and stool throughout the colon and rectum.  Innumerable diverticula throughout predominantly the distal colon.  No mesenteric inflammatory change.  There is mild fatty infiltration of the ileocecal valve.     Bladder is partially distended.  Prior hysterectomy.  Vaginal cuff and rectum unremarkable.     No significant mesenteric or retroperitoneal lymphadenopathy.     Impression:     1. Prominent slightly irregular enhancing soft tissue mass of the inferior pole the left kidney extending into the left renal hilum with findings suspicious for distal left renal vein thrombosis.  These findings are suspicious for renal neoplasia.  Further evaluation is recommended.  2. Extensive diverticulosis        There are no hospital problems to display for this patient.

## 2023-12-12 NOTE — PROGRESS NOTES
Procedure Order to Urology [[id number]]    Electronically signed by: Winston Chand MD on 12/12/23 1321 Status: Active   Ordering user: Winston Chand MD 12/12/23 1321 Ordering provider: Winston Chand MD   Authorized by: Winston Chand MD Ordering mode: Standard   Frequency:  12/12/23 -     Diagnoses  Other specified disorders of kidney and ureter [N28.89]   Questionnaire    Question Answer   Procedure Robotic Radical Nephrectomy Comment - 1/17 left   Facility Name: [facility] surgery admit ( 1 midnight), please order, SCD and LENIN,IV start , NPO, General Anesthesia, CBC, BMP, PT/INR ,type and screen, U/A and culture, EKG, Chest X ray, Ancef 2 gram ( alternative if all to PCN cipro 400 mg IV )  please comment:Will stay overnight High risk VTE with LENIN and SCD and reason for no pharmacologic VTE as risk of bleeding cpt code - simple 53868 radical cpt code -95931

## 2023-12-13 NOTE — OP NOTE
[facility] Urology Operative/Brief Discharge Note     Date: 12/12/2023     Staff Surgeon: Winston Chand MD     Pre-Op Diagnosis: gross hematuria     Post-Op Diagnosis: gross hematuria     Procedure(s) Performed:   Cystoscopy, flexible     Specimen(s): none     Anesthesia: Local anesthesia topical 2% lidocaine gel     Findings: small urethral polyps, mild erythema at bladder neck on retroflexion, negative bladder     Estimated Blood Loss: none     Drains: none     Complications: none     Indications for procedure:  68-year-old female with recent isolated gross hematuria x3d and persistent trace blood on Udip. No risk factors, urine cytology negative, CTU with large solid enhancing left renal mass most consistent with RCC with renal vein invasion. Here for cystoscopic evaluation.     Procedure in detail:  After appropriate informed consent was obtained the patient was taken to the cystoscopy suite and placed in lithotomy position.  She was prepped and draped in standard sterile cystoscopic fashion.  WHO approved time-out was performed.  2% xylocaine jelly instilled per urethral meatus and used to lubricate the tip of the flexible cystoscope.       Digital flexible cystoscope was passed per urethra into bladder.   Systematic inspection of bladder was negative for any mucosal lesions or abnormalities, bilateral ureteral orifices in orthotopic position with clear efflux, and on retroflexion there was some mild erythema at bladder neck, of which upon withdrawing scope slowly there were some few hypervascular benign-appearing urethral polyps near the bladder neck     The patient tolerated the procedure well without complication and ambulated to the discharge area     Disposition:   No lower tract source for gross hematuria.  Given significant renal mass with renal vein invasion and impingement on collecting system, with patient noted to be dehydrated and active at time of gross hematuria, likely was irritation from upper  tract source and involvement of renal mass to collecting system.  On review of imaging with patient and family, noted parenchymal origin with the extent into vein and pushing into collecting system, and is most consistent with RCC, not urothelial, and cytology is negative.    Extensive record review imaging with patient and family denoting her large >6cm lower pole left renal mass, solid enhancing and with irregular appearance concerning for RCC, extending centrally into collecting system and with extent into renal vein. We did discuss that approximately 80-85% or greater of solid enhancing renal masses represent a renal cell carcinoma, and surgical excision of these masses is the preferred treatment strategy, with nephron-sparing approach/partial nephrectomy preferred when possible.  However given size and location of this mass and anatomic concerns especially extent from lower to mid pole and into collecting system, and concern for T3 renal vein invasion, this would yield anatomic challenges to partial nephrectomy, and given these characteristics, radical nephrectomy would be recommended.  We discussed robot-assisted laparoscopic approach in contrast to open approach, and after risk benefit discussion he elected to proceed with robotic/lap right radical nephrectomy.      Risks and benefits of right lap/robotic radical nephrectomy discussed in detail, including but not limited to pain, infection, bleeding, scarring, injury to surrounding structures (spleen, colon, diaphragm, etc), hernia, postoperative ileus, declining renal function, need for conversion to open procedure. Also discussed adrenal sparing vs non, and potential effect on blood pressure if adrenal removed.   Reviewed longterm risk of CKD and possible need for nephrologic evaluation and follow up after - low risk given normal contralateral kidney and overall good health w/o htn.dm.      After extensive discussion, all questions answered and informed  consent obtained and she elected to proceed with left robotic radical nephrectomy on 1/17/24.   - will get MRI abdomen w/wo for further assessment of venous involvement and CT chest for staging prior       Discharge home today status post uncomplicated procedure as above  Diet - resume home diet  Follow up: 1/17 left robotic radical nephrectomy  - staging MRI abdomen and chest ct within 2 weeks  Instructions:  Drink plenty of water, may see blood in urine or burning with urination for 24-48 hours.  Complete prophylactic antibiotics  Meds:     Medication List        START taking these medications      ciprofloxacin HCl 500 MG tablet  Commonly known as: CIPRO  Take 1 tablet (500 mg total) by mouth 2 (two) times daily. for 2 days            CONTINUE taking these medications      ALPRAZolam 0.5 MG tablet  Commonly known as: XANAX  Take 1 tablet (0.5 mg total) by mouth 3 (three) times daily as needed for Anxiety.     doxycycline 50 MG capsule  Take 1 capsule (50 mg total) by mouth 2 (two) times daily.     sertraline 100 MG tablet  Commonly known as: ZOLOFT  TAKE 1 & 1/2 (ONE & ONE-HALF) TABLETS BY MOUTH ONCE DAILY     traZODone 50 MG tablet  Commonly known as: DESYREL  Take 1 tablet (50 mg total) by mouth every evening.            STOP taking these medications      amitriptyline 25 MG tablet  Commonly known as: ELAVIL     amoxicillin-clavulanate 875-125mg 875-125 mg per tablet  Commonly known as: AUGMENTIN               Where to Get Your Medications        These medications were sent to [id number]  [place] [address] [facility] [facility]  [address] [address] [place] [address]      Phone: [phone]   ciprofloxacin HCl 500 MG tablet

## 2023-12-14 LAB
BACTERIA UR CULT: NORMAL
BACTERIA UR CULT: NORMAL

## 2023-12-22 ENCOUNTER — HOSPITAL ENCOUNTER (OUTPATIENT)
Dept: RADIOLOGY | Facility: HOSPITAL | Age: 68
Discharge: HOME | End: 2023-12-22
Attending: UROLOGY
Payer: MEDICARE

## 2023-12-22 DIAGNOSIS — N28.89 OTHER SPECIFIED DISORDERS OF KIDNEY AND URETER: ICD-10-CM

## 2023-12-22 PROCEDURE — 25500020 PHARM REV CODE 255

## 2023-12-22 PROCEDURE — A9585 GADOBUTROL INJECTION: HCPCS

## 2023-12-22 PROCEDURE — 74183 MRI ABD W/O CNTR FLWD CNTR: CPT | Mod: TC

## 2023-12-22 PROCEDURE — 74183 MRI ABDOMEN W WO CONTRAST: ICD-10-PCS | Mod: 26,,, | Performed by: RADIOLOGY

## 2023-12-22 PROCEDURE — 71250 CT THORAX DX C-: CPT | Mod: TC

## 2023-12-22 PROCEDURE — 74183 MRI ABD W/O CNTR FLWD CNTR: CPT | Mod: 26,,, | Performed by: RADIOLOGY

## 2023-12-22 PROCEDURE — 71250 CT THORAX DX C-: CPT | Mod: 26,,, | Performed by: RADIOLOGY

## 2023-12-22 PROCEDURE — 71250 CT CHEST WITHOUT CONTRAST: ICD-10-PCS | Mod: 26,,, | Performed by: RADIOLOGY

## 2023-12-22 RX ORDER — GADOBUTROL 604.72 MG/ML
INJECTION INTRAVENOUS
Status: COMPLETED
Start: 2023-12-22 | End: 2023-12-22

## 2023-12-22 RX ADMIN — GADOBUTROL 10 ML: 604.72 INJECTION INTRAVENOUS at 04:12

## 2024-01-09 ENCOUNTER — HOSPITAL ENCOUNTER (OUTPATIENT)
Dept: PREADMISSION TESTING | Facility: HOSPITAL | Age: 69
Discharge: HOME | End: 2024-01-09
Attending: UROLOGY
Payer: MEDICARE

## 2024-01-09 ENCOUNTER — HOSPITAL ENCOUNTER (OUTPATIENT)
Dept: RADIOLOGY | Facility: HOSPITAL | Age: 69
Discharge: HOME | End: 2024-01-09
Attending: UROLOGY
Payer: MEDICARE

## 2024-01-09 DIAGNOSIS — N28.89 RENAL MASS, LEFT: ICD-10-CM

## 2024-01-09 DIAGNOSIS — Z01.810 PREOP CARDIOVASCULAR EXAM: ICD-10-CM

## 2024-01-09 PROCEDURE — 93005 ELECTROCARDIOGRAM TRACING: CPT

## 2024-01-09 PROCEDURE — 71046 X-RAY EXAM CHEST 2 VIEWS: CPT | Mod: 26,,, | Performed by: RADIOLOGY

## 2024-01-09 PROCEDURE — 71046 X-RAY EXAM CHEST 2 VIEWS: CPT | Mod: TC

## 2024-01-09 PROCEDURE — 93010 ELECTROCARDIOGRAM REPORT: CPT | Mod: ,,, | Performed by: GENERAL PRACTICE

## 2024-01-09 NOTE — DISCHARGE INSTRUCTIONS
To confirm, Your doctor has instructed you that surgery is scheduled for:  1-17-24    Please report to [facility], Registration the morning of surgery. You must check-in and receive a wristband before going to your procedure.  [address] [place] [address] [place] [address]    Pre-Op will call the afternoon prior to surgery between 1:00 and 6:00 PM with the final arrival time.  Phone number: [phone]    PLEASE NOTE:  The surgery schedule has many variables which may affect the time of your surgery case.  Family members should be available if your surgery time changes.  Plan to be here the day of your procedure between 4-6 hours.    MEDICATIONS:  TAKE ONLY THESE MEDICATIONS WITH A SMALL SIP OF WATER THE MORNING OF YOUR PROCEDURE:    See med sheet      DO NOT TAKE THESE MEDICATIONS 5-7 DAYS PRIOR to your procedure or per your surgeon's request:   ASPIRIN, ALEVE, ADVIL, IBUPROFEN, FISH OIL VITAMIN E, HERBALS  (May take Tylenol)    ONLY if you are prescribed any types of blood thinners such as:  Aspirin, Coumadin, Plavix, Pradaxa, Xarelto, Aggrenox, Effient, Eliquis, Savasya, Brilinta, or any other, ask your surgeon whether you should stop taking them and how long before surgery you should stop.  You may also need to verify with the prescribing physician if it is ok to stop your medication.      INSTRUCTIONS IMPORTANT!!  Do not eat or drink anything between midnight and the time of your procedure- this includes gum, mints, and candy.  Do not smoke or drink alcoholic beverages 24 hours prior to your procedure.  Shower the night before AND the morning of your procedure with a Chlorhexidine wash such as Hibiclens or Dial antibacterial soap from the neck down.  Do not get it on your face or in your eyes.  You may use your own shampoo and face wash. This helps your skin to be as bacteria free as possible.    If you wear contact lenses, dentures, hearing aids or glasses, bring a container to put them in  during surgery and give to a family member for safe keeping.  Please leave all jewelry, piercing's and valuables at home. You must remove your false eyelashes prior to surgery.    DO NOT remove hair from the surgery site.  Do not shave the incision site unless you are given specific instructions to do so.    ONLY if you have been diagnosed with sleep apnea please bring your C-PAP machine.  ONLY if you wear home oxygen please bring your portable oxygen tank the day of your procedure.  ONLY if you have a history of OPEN HEART SURGERY you will need a clearance from your Cardiologist per Anesthesia.      ONLY for patients requiring bowel prep, written instructions will be given by your doctor's office.  ONLY if you have a neuro stimulator, please bring the controller with you the morning of surgery  ONLY if a type and screen test is needed before surgery, please return:  If your doctor has scheduled you for an overnight stay, bring a small overnight bag with any personal items you need.  Make arrangements in advance for transportation home by a responsible adult.  It is not safe to drive a vehicle during the 24 hours after anesthesia.          All  facilities and properties are tobacco free.  Smoking is NOT allowed.   If you have any questions about these instructions, call Pre-Op Admit  Nursing at [phone] or the Pre-Op Day Surgery Unit at [phone].

## 2024-01-15 ENCOUNTER — TELEPHONE (OUTPATIENT)
Dept: UROLOGY | Facility: CLINIC | Age: 69
End: 2024-01-15
Payer: MEDICARE

## 2024-01-15 NOTE — TELEPHONE ENCOUNTER
Discussed scheduled case this week with pt given winter weather/icy roads et al.   Pt will r/s from 1/17 to 1/24

## 2024-01-23 ENCOUNTER — ANESTHESIA EVENT (OUTPATIENT)
Dept: SURGERY | Facility: HOSPITAL | Age: 69
DRG: 658 | End: 2024-01-23
Payer: MEDICARE

## 2024-01-23 ENCOUNTER — TELEPHONE (OUTPATIENT)
Dept: UROLOGY | Facility: CLINIC | Age: 69
End: 2024-01-23
Payer: MEDICARE

## 2024-01-23 NOTE — TELEPHONE ENCOUNTER
----- Message from Olive Luther sent at 1/23/2024  1:09 PM CST -----  Regarding: time for procedure  Contact: pt  Type:  Needs Medical Advice    Who Called: pt    Would the patient rather a call back or a response via [facility]? Call back    Best Call Back Number: [phone]    Additional Information: pt wants to know time and location of tomorrow's procedure.  Please advise.  Thank you.

## 2024-01-24 ENCOUNTER — HOSPITAL ENCOUNTER (INPATIENT)
Facility: HOSPITAL | Age: 69
LOS: 1 days | Discharge: HOME | DRG: 658 | End: 2024-01-25
Attending: UROLOGY | Admitting: UROLOGY
Payer: MEDICARE

## 2024-01-24 ENCOUNTER — ANESTHESIA (OUTPATIENT)
Dept: SURGERY | Facility: HOSPITAL | Age: 69
DRG: 658 | End: 2024-01-24
Payer: MEDICARE

## 2024-01-24 DIAGNOSIS — N28.89 RENAL MASS, LEFT: Primary | ICD-10-CM

## 2024-01-24 DIAGNOSIS — Z01.818 PREOP TESTING: ICD-10-CM

## 2024-01-24 LAB
ABO + RH BLD: NORMAL
ANION GAP SERPL CALC-SCNC: 14 MMOL/L (ref 8–16)
BASOPHILS # BLD AUTO: 0.01 K/UL (ref 0–0.2)
BASOPHILS NFR BLD: 0.1 % (ref 0–1.9)
BLD GP AB SCN CELLS X3 SERPL QL: NORMAL
BUN SERPL-MCNC: 14 MG/DL (ref 8–23)
CALCIUM SERPL-MCNC: 7.7 MG/DL (ref 8.7–10.5)
CHLORIDE SERPL-SCNC: 104 MMOL/L (ref 95–110)
CO2 SERPL-SCNC: 21 MMOL/L (ref 23–29)
CREAT SERPL-MCNC: 1.3 MG/DL (ref 0.5–1.4)
DIFFERENTIAL METHOD BLD: ABNORMAL
EOSINOPHIL # BLD AUTO: 0 K/UL (ref 0–0.5)
EOSINOPHIL NFR BLD: 0 % (ref 0–8)
ERYTHROCYTE [DISTWIDTH] IN BLOOD BY AUTOMATED COUNT: 14 % (ref 11.5–14.5)
EST. GFR  (NO RACE VARIABLE): 45 ML/MIN/1.73 M^2
GLUCOSE SERPL-MCNC: 196 MG/DL (ref 70–110)
HCT VFR BLD AUTO: 33.7 % (ref 37–48.5)
HGB BLD-MCNC: 10.7 G/DL (ref 12–16)
IMM GRANULOCYTES # BLD AUTO: 0.02 K/UL (ref 0–0.04)
IMM GRANULOCYTES NFR BLD AUTO: 0.3 % (ref 0–0.5)
LYMPHOCYTES # BLD AUTO: 0.3 K/UL (ref 1–4.8)
LYMPHOCYTES NFR BLD: 4.4 % (ref 18–48)
MCH RBC QN AUTO: 28.2 PG (ref 27–31)
MCHC RBC AUTO-ENTMCNC: 31.8 G/DL (ref 32–36)
MCV RBC AUTO: 89 FL (ref 82–98)
MONOCYTES # BLD AUTO: 0.3 K/UL (ref 0.3–1)
MONOCYTES NFR BLD: 4.1 % (ref 4–15)
NEUTROPHILS # BLD AUTO: 6.3 K/UL (ref 1.8–7.7)
NEUTROPHILS NFR BLD: 91.1 % (ref 38–73)
NRBC BLD-RTO: 0 /100 WBC
PLATELET # BLD AUTO: 161 K/UL (ref 150–450)
PMV BLD AUTO: 9 FL (ref 9.2–12.9)
POTASSIUM SERPL-SCNC: 4.2 MMOL/L (ref 3.5–5.1)
RBC # BLD AUTO: 3.8 M/UL (ref 4–5.4)
SODIUM SERPL-SCNC: 139 MMOL/L (ref 136–145)
SPECIMEN OUTDATE: NORMAL
WBC # BLD AUTO: 6.86 K/UL (ref 3.9–12.7)

## 2024-01-24 PROCEDURE — 25000003 PHARM REV CODE 250: Performed by: ANESTHESIOLOGY

## 2024-01-24 PROCEDURE — 11000001 HC ACUTE MED/SURG PRIVATE ROOM

## 2024-01-24 PROCEDURE — 63600175 PHARM REV CODE 636 W HCPCS: Mod: JZ,JG | Performed by: UROLOGY

## 2024-01-24 PROCEDURE — 25000003 PHARM REV CODE 250: Performed by: NURSE ANESTHETIST, CERTIFIED REGISTERED

## 2024-01-24 PROCEDURE — 63600175 PHARM REV CODE 636 W HCPCS: Performed by: NURSE ANESTHETIST, CERTIFIED REGISTERED

## 2024-01-24 PROCEDURE — 36000712 HC OR TIME LEV V 1ST 15 MIN: Performed by: UROLOGY

## 2024-01-24 PROCEDURE — 63600175 PHARM REV CODE 636 W HCPCS: Performed by: ANESTHESIOLOGY

## 2024-01-24 PROCEDURE — 99900035 HC TECH TIME PER 15 MIN (STAT)

## 2024-01-24 PROCEDURE — 71000033 HC RECOVERY, INTIAL HOUR: Performed by: UROLOGY

## 2024-01-24 PROCEDURE — 86901 BLOOD TYPING SEROLOGIC RH(D): CPT | Performed by: UROLOGY

## 2024-01-24 PROCEDURE — 0TT14ZZ RESECTION OF LEFT KIDNEY, PERCUTANEOUS ENDOSCOPIC APPROACH: ICD-10-PCS | Performed by: UROLOGY

## 2024-01-24 PROCEDURE — P9045 ALBUMIN (HUMAN), 5%, 250 ML: HCPCS | Mod: JZ,JG | Performed by: NURSE ANESTHETIST, CERTIFIED REGISTERED

## 2024-01-24 PROCEDURE — 8E0W4CZ ROBOTIC ASSISTED PROCEDURE OF TRUNK REGION, PERCUTANEOUS ENDOSCOPIC APPROACH: ICD-10-PCS | Performed by: UROLOGY

## 2024-01-24 PROCEDURE — 94761 N-INVAS EAR/PLS OXIMETRY MLT: CPT

## 2024-01-24 PROCEDURE — 25000003 PHARM REV CODE 250: Performed by: UROLOGY

## 2024-01-24 PROCEDURE — 71000039 HC RECOVERY, EACH ADD'L HOUR: Performed by: UROLOGY

## 2024-01-24 PROCEDURE — 94799 UNLISTED PULMONARY SVC/PX: CPT | Mod: XB

## 2024-01-24 PROCEDURE — S5010 5% DEXTROSE AND 0.45% SALINE: HCPCS | Performed by: UROLOGY

## 2024-01-24 PROCEDURE — 63600175 PHARM REV CODE 636 W HCPCS: Performed by: UROLOGY

## 2024-01-24 PROCEDURE — 07BD4ZX EXCISION OF AORTIC LYMPHATIC, PERCUTANEOUS ENDOSCOPIC APPROACH, DIAGNOSTIC: ICD-10-PCS | Performed by: UROLOGY

## 2024-01-24 PROCEDURE — 50545 LAPARO RADICAL NEPHRECTOMY: CPT | Mod: 22,LT,, | Performed by: UROLOGY

## 2024-01-24 PROCEDURE — 27000221 HC OXYGEN, UP TO 24 HOURS

## 2024-01-24 PROCEDURE — 36415 COLL VENOUS BLD VENIPUNCTURE: CPT | Performed by: UROLOGY

## 2024-01-24 PROCEDURE — 27201423 OPTIME MED/SURG SUP & DEVICES STERILE SUPPLY: Performed by: UROLOGY

## 2024-01-24 PROCEDURE — 85025 COMPLETE CBC W/AUTO DIFF WBC: CPT | Performed by: UROLOGY

## 2024-01-24 PROCEDURE — 80048 BASIC METABOLIC PNL TOTAL CA: CPT | Performed by: UROLOGY

## 2024-01-24 PROCEDURE — 37000009 HC ANESTHESIA EA ADD 15 MINS: Performed by: UROLOGY

## 2024-01-24 PROCEDURE — 36000713 HC OR TIME LEV V EA ADD 15 MIN: Performed by: UROLOGY

## 2024-01-24 PROCEDURE — 37000008 HC ANESTHESIA 1ST 15 MINUTES: Performed by: UROLOGY

## 2024-01-24 PROCEDURE — 88307 TISSUE EXAM BY PATHOLOGIST: CPT | Mod: TC,59 | Performed by: PATHOLOGY

## 2024-01-24 RX ORDER — FENTANYL CITRATE 50 UG/ML
12.5 INJECTION, SOLUTION INTRAMUSCULAR; INTRAVENOUS ONCE
Status: COMPLETED | OUTPATIENT
Start: 2024-01-24 | End: 2024-01-24

## 2024-01-24 RX ORDER — LIDOCAINE HYDROCHLORIDE 10 MG/ML
1 INJECTION, SOLUTION EPIDURAL; INFILTRATION; INTRACAUDAL; PERINEURAL ONCE
Status: DISCONTINUED | OUTPATIENT
Start: 2024-01-24 | End: 2024-01-24

## 2024-01-24 RX ORDER — VECURONIUM BROMIDE FOR INJECTION 1 MG/ML
INJECTION, POWDER, LYOPHILIZED, FOR SOLUTION INTRAVENOUS
Status: DISCONTINUED | OUTPATIENT
Start: 2024-01-24 | End: 2024-01-24

## 2024-01-24 RX ORDER — FENTANYL CITRATE 50 UG/ML
INJECTION, SOLUTION INTRAMUSCULAR; INTRAVENOUS
Status: DISCONTINUED | OUTPATIENT
Start: 2024-01-24 | End: 2024-01-24

## 2024-01-24 RX ORDER — MORPHINE SULFATE 2 MG/ML
2 INJECTION, SOLUTION INTRAMUSCULAR; INTRAVENOUS EVERY 4 HOURS PRN
Status: DISCONTINUED | OUTPATIENT
Start: 2024-01-24 | End: 2024-01-25

## 2024-01-24 RX ORDER — ACETAMINOPHEN 325 MG/1
650 TABLET ORAL EVERY 4 HOURS PRN
Status: DISCONTINUED | OUTPATIENT
Start: 2024-01-24 | End: 2024-01-25 | Stop reason: HOSPADM

## 2024-01-24 RX ORDER — EPHEDRINE SULFATE 50 MG/ML
INJECTION, SOLUTION INTRAVENOUS
Status: DISCONTINUED | OUTPATIENT
Start: 2024-01-24 | End: 2024-01-24

## 2024-01-24 RX ORDER — PHENYLEPHRINE HYDROCHLORIDE 10 MG/ML
INJECTION INTRAVENOUS
Status: DISCONTINUED | OUTPATIENT
Start: 2024-01-24 | End: 2024-01-24

## 2024-01-24 RX ORDER — DOCUSATE SODIUM 100 MG/1
100 CAPSULE, LIQUID FILLED ORAL 2 TIMES DAILY
Status: DISCONTINUED | OUTPATIENT
Start: 2024-01-24 | End: 2024-01-25 | Stop reason: HOSPADM

## 2024-01-24 RX ORDER — ACETAMINOPHEN 10 MG/ML
1000 INJECTION, SOLUTION INTRAVENOUS EVERY 8 HOURS
Status: COMPLETED | OUTPATIENT
Start: 2024-01-24 | End: 2024-01-25

## 2024-01-24 RX ORDER — LIDOCAINE HYDROCHLORIDE 20 MG/ML
INJECTION INTRAVENOUS
Status: DISCONTINUED | OUTPATIENT
Start: 2024-01-24 | End: 2024-01-24

## 2024-01-24 RX ORDER — ONDANSETRON HYDROCHLORIDE 2 MG/ML
4 INJECTION, SOLUTION INTRAVENOUS EVERY 6 HOURS PRN
Status: DISCONTINUED | OUTPATIENT
Start: 2024-01-24 | End: 2024-01-25 | Stop reason: HOSPADM

## 2024-01-24 RX ORDER — CARBOXYMETHYLCELLULOSE SODIUM 10 MG/ML
GEL OPHTHALMIC
Status: DISCONTINUED | OUTPATIENT
Start: 2024-01-24 | End: 2024-01-24

## 2024-01-24 RX ORDER — BUPIVACAINE HYDROCHLORIDE 5 MG/ML
INJECTION, SOLUTION EPIDURAL; INTRACAUDAL
Status: DISCONTINUED | OUTPATIENT
Start: 2024-01-24 | End: 2024-01-24 | Stop reason: HOSPADM

## 2024-01-24 RX ORDER — DEXAMETHASONE SODIUM PHOSPHATE 4 MG/ML
INJECTION, SOLUTION INTRA-ARTICULAR; INTRALESIONAL; INTRAMUSCULAR; INTRAVENOUS; SOFT TISSUE
Status: DISCONTINUED | OUTPATIENT
Start: 2024-01-24 | End: 2024-01-24

## 2024-01-24 RX ORDER — CEFAZOLIN SODIUM 2 G/50ML
2 SOLUTION INTRAVENOUS
Status: COMPLETED | OUTPATIENT
Start: 2024-01-24 | End: 2024-01-25

## 2024-01-24 RX ORDER — DEXTROSE MONOHYDRATE AND SODIUM CHLORIDE 5; .45 G/100ML; G/100ML
INJECTION, SOLUTION INTRAVENOUS CONTINUOUS
Status: DISCONTINUED | OUTPATIENT
Start: 2024-01-24 | End: 2024-01-25

## 2024-01-24 RX ORDER — ALPRAZOLAM 0.25 MG/1
0.5 TABLET ORAL 2 TIMES DAILY PRN
Status: DISCONTINUED | OUTPATIENT
Start: 2024-01-24 | End: 2024-01-25 | Stop reason: HOSPADM

## 2024-01-24 RX ORDER — ACETAMINOPHEN 10 MG/ML
INJECTION, SOLUTION INTRAVENOUS
Status: DISCONTINUED | OUTPATIENT
Start: 2024-01-24 | End: 2024-01-24

## 2024-01-24 RX ORDER — ROCURONIUM BROMIDE 10 MG/ML
INJECTION, SOLUTION INTRAVENOUS
Status: DISCONTINUED | OUTPATIENT
Start: 2024-01-24 | End: 2024-01-24

## 2024-01-24 RX ORDER — MUPIROCIN 20 MG/G
OINTMENT TOPICAL 2 TIMES DAILY
Status: DISCONTINUED | OUTPATIENT
Start: 2024-01-24 | End: 2024-01-25 | Stop reason: HOSPADM

## 2024-01-24 RX ORDER — PROPOFOL 10 MG/ML
VIAL (ML) INTRAVENOUS
Status: DISCONTINUED | OUTPATIENT
Start: 2024-01-24 | End: 2024-01-24

## 2024-01-24 RX ORDER — ONDANSETRON HYDROCHLORIDE 2 MG/ML
INJECTION, SOLUTION INTRAVENOUS
Status: DISCONTINUED | OUTPATIENT
Start: 2024-01-24 | End: 2024-01-24

## 2024-01-24 RX ORDER — FENTANYL CITRATE 50 UG/ML
25 INJECTION, SOLUTION INTRAMUSCULAR; INTRAVENOUS EVERY 5 MIN PRN
Status: DISCONTINUED | OUTPATIENT
Start: 2024-01-24 | End: 2024-01-24

## 2024-01-24 RX ORDER — DIPHENHYDRAMINE HYDROCHLORIDE 50 MG/ML
INJECTION INTRAMUSCULAR; INTRAVENOUS
Status: DISCONTINUED | OUTPATIENT
Start: 2024-01-24 | End: 2024-01-24

## 2024-01-24 RX ORDER — HEPARIN SODIUM 10000 [USP'U]/ML
INJECTION, SOLUTION INTRAVENOUS; SUBCUTANEOUS
Status: DISCONTINUED | OUTPATIENT
Start: 2024-01-24 | End: 2024-01-24 | Stop reason: HOSPADM

## 2024-01-24 RX ORDER — SERTRALINE HYDROCHLORIDE 50 MG/1
100 TABLET, FILM COATED ORAL DAILY
Status: DISCONTINUED | OUTPATIENT
Start: 2024-01-25 | End: 2024-01-25 | Stop reason: HOSPADM

## 2024-01-24 RX ORDER — SUCCINYLCHOLINE CHLORIDE 20 MG/ML
INJECTION INTRAMUSCULAR; INTRAVENOUS
Status: DISCONTINUED | OUTPATIENT
Start: 2024-01-24 | End: 2024-01-24

## 2024-01-24 RX ORDER — OXYCODONE HYDROCHLORIDE 5 MG/1
5 TABLET ORAL
Status: DISCONTINUED | OUTPATIENT
Start: 2024-01-24 | End: 2024-01-24

## 2024-01-24 RX ORDER — METOCLOPRAMIDE HYDROCHLORIDE 5 MG/ML
10 INJECTION INTRAMUSCULAR; INTRAVENOUS EVERY 10 MIN PRN
Status: COMPLETED | OUTPATIENT
Start: 2024-01-24 | End: 2024-01-24

## 2024-01-24 RX ORDER — POLYETHYLENE GLYCOL 3350 17 G/17G
17 POWDER, FOR SOLUTION ORAL DAILY
Status: DISCONTINUED | OUTPATIENT
Start: 2024-01-25 | End: 2024-01-25 | Stop reason: HOSPADM

## 2024-01-24 RX ORDER — HYDROMORPHONE HYDROCHLORIDE 2 MG/ML
0.2 INJECTION, SOLUTION INTRAMUSCULAR; INTRAVENOUS; SUBCUTANEOUS EVERY 5 MIN PRN
Status: DISCONTINUED | OUTPATIENT
Start: 2024-01-24 | End: 2024-01-24

## 2024-01-24 RX ORDER — MIDAZOLAM HYDROCHLORIDE 1 MG/ML
INJECTION INTRAMUSCULAR; INTRAVENOUS
Status: DISCONTINUED | OUTPATIENT
Start: 2024-01-24 | End: 2024-01-24

## 2024-01-24 RX ORDER — TALC
6 POWDER (GRAM) TOPICAL NIGHTLY PRN
Status: DISCONTINUED | OUTPATIENT
Start: 2024-01-24 | End: 2024-01-25 | Stop reason: HOSPADM

## 2024-01-24 RX ORDER — TRAMADOL HYDROCHLORIDE 50 MG/1
50 TABLET ORAL EVERY 6 HOURS PRN
Status: DISCONTINUED | OUTPATIENT
Start: 2024-01-24 | End: 2024-01-25 | Stop reason: HOSPADM

## 2024-01-24 RX ORDER — SIMETHICONE 80 MG
2 TABLET,CHEWABLE ORAL 3 TIMES DAILY
Status: DISCONTINUED | OUTPATIENT
Start: 2024-01-24 | End: 2024-01-25 | Stop reason: HOSPADM

## 2024-01-24 RX ORDER — ALBUMIN HUMAN 50 G/1000ML
SOLUTION INTRAVENOUS
Status: DISCONTINUED | OUTPATIENT
Start: 2024-01-24 | End: 2024-01-24

## 2024-01-24 RX ORDER — CEFAZOLIN SODIUM 2 G/50ML
2 SOLUTION INTRAVENOUS
Status: COMPLETED | OUTPATIENT
Start: 2024-01-24 | End: 2024-01-24

## 2024-01-24 RX ADMIN — SODIUM CHLORIDE, SODIUM GLUCONATE, SODIUM ACETATE, POTASSIUM CHLORIDE, MAGNESIUM CHLORIDE, SODIUM PHOSPHATE, DIBASIC, AND POTASSIUM PHOSPHATE: .53; .5; .37; .037; .03; .012; .00082 INJECTION, SOLUTION INTRAVENOUS at 10:01

## 2024-01-24 RX ADMIN — ONDANSETRON 4 MG: 2 INJECTION INTRAMUSCULAR; INTRAVENOUS at 03:01

## 2024-01-24 RX ADMIN — PHENYLEPHRINE HYDROCHLORIDE 100 MCG: 10 INJECTION INTRAVENOUS at 11:01

## 2024-01-24 RX ADMIN — SODIUM CHLORIDE, SODIUM GLUCONATE, SODIUM ACETATE, POTASSIUM CHLORIDE, MAGNESIUM CHLORIDE, SODIUM PHOSPHATE, DIBASIC, AND POTASSIUM PHOSPHATE: .53; .5; .37; .037; .03; .012; .00082 INJECTION, SOLUTION INTRAVENOUS at 07:01

## 2024-01-24 RX ADMIN — MIDAZOLAM HYDROCHLORIDE 2 MG: 1 INJECTION, SOLUTION INTRAMUSCULAR; INTRAVENOUS at 07:01

## 2024-01-24 RX ADMIN — CEFAZOLIN SODIUM 2 G: 2 SOLUTION INTRAVENOUS at 11:01

## 2024-01-24 RX ADMIN — ACETAMINOPHEN 1000 MG: 10 INJECTION INTRAVENOUS at 01:01

## 2024-01-24 RX ADMIN — SUGAMMADEX 200 MG: 100 INJECTION, SOLUTION INTRAVENOUS at 12:01

## 2024-01-24 RX ADMIN — FENTANYL CITRATE 50 MCG: 50 INJECTION, SOLUTION INTRAMUSCULAR; INTRAVENOUS at 12:01

## 2024-01-24 RX ADMIN — MUPIROCIN: 20 OINTMENT TOPICAL at 08:01

## 2024-01-24 RX ADMIN — PHENYLEPHRINE HYDROCHLORIDE 200 MCG: 10 INJECTION INTRAVENOUS at 12:01

## 2024-01-24 RX ADMIN — PROPOFOL 150 MG: 10 INJECTION, EMULSION INTRAVENOUS at 07:01

## 2024-01-24 RX ADMIN — EPHEDRINE SULFATE 5 MG: 50 INJECTION, SOLUTION INTRAMUSCULAR; INTRAVENOUS; SUBCUTANEOUS at 08:01

## 2024-01-24 RX ADMIN — PHENYLEPHRINE HYDROCHLORIDE 200 MCG: 10 INJECTION INTRAVENOUS at 11:01

## 2024-01-24 RX ADMIN — MORPHINE SULFATE 2 MG: 2 INJECTION, SOLUTION INTRAMUSCULAR; INTRAVENOUS at 08:01

## 2024-01-24 RX ADMIN — ALBUMIN (HUMAN) 250 ML: 12.5 SOLUTION INTRAVENOUS at 10:01

## 2024-01-24 RX ADMIN — CEFAZOLIN SODIUM 2 G: 2 SOLUTION INTRAVENOUS at 07:01

## 2024-01-24 RX ADMIN — VECURONIUM BROMIDE 2 MG: 10 INJECTION, POWDER, LYOPHILIZED, FOR SOLUTION INTRAVENOUS at 09:01

## 2024-01-24 RX ADMIN — ONDANSETRON 4 MG: 2 INJECTION INTRAMUSCULAR; INTRAVENOUS at 12:01

## 2024-01-24 RX ADMIN — CEFAZOLIN SODIUM 2 G: 2 SOLUTION INTRAVENOUS at 06:01

## 2024-01-24 RX ADMIN — EPHEDRINE SULFATE 10 MG: 50 INJECTION, SOLUTION INTRAMUSCULAR; INTRAVENOUS; SUBCUTANEOUS at 10:01

## 2024-01-24 RX ADMIN — SIMETHICONE 160 MG: 80 TABLET, CHEWABLE ORAL at 01:01

## 2024-01-24 RX ADMIN — FENTANYL CITRATE 25 MCG: 50 INJECTION, SOLUTION INTRAMUSCULAR; INTRAVENOUS at 09:01

## 2024-01-24 RX ADMIN — DEXTROSE AND SODIUM CHLORIDE: 5; 450 INJECTION, SOLUTION INTRAVENOUS at 10:01

## 2024-01-24 RX ADMIN — METOCLOPRAMIDE 10 MG: 5 INJECTION, SOLUTION INTRAMUSCULAR; INTRAVENOUS at 01:01

## 2024-01-24 RX ADMIN — CARBOXYMETHYLCELLULOSE SODIUM 1 EACH: 10 GEL OPHTHALMIC at 07:01

## 2024-01-24 RX ADMIN — ACETAMINOPHEN 1000 MG: 10 INJECTION, SOLUTION INTRAVENOUS at 07:01

## 2024-01-24 RX ADMIN — ROCURONIUM BROMIDE 40 MG: 10 INJECTION, SOLUTION INTRAVENOUS at 08:01

## 2024-01-24 RX ADMIN — SODIUM CHLORIDE, SODIUM GLUCONATE, SODIUM ACETATE, POTASSIUM CHLORIDE AND MAGNESIUM CHLORIDE: 526; 502; 368; 37; 30 INJECTION, SOLUTION INTRAVENOUS at 12:01

## 2024-01-24 RX ADMIN — ONDANSETRON 4 MG: 2 INJECTION INTRAMUSCULAR; INTRAVENOUS at 08:01

## 2024-01-24 RX ADMIN — EPHEDRINE SULFATE 10 MG: 50 INJECTION, SOLUTION INTRAMUSCULAR; INTRAVENOUS; SUBCUTANEOUS at 07:01

## 2024-01-24 RX ADMIN — EPHEDRINE SULFATE 5 MG: 50 INJECTION, SOLUTION INTRAMUSCULAR; INTRAVENOUS; SUBCUTANEOUS at 07:01

## 2024-01-24 RX ADMIN — FENTANYL CITRATE 25 MCG: 50 INJECTION, SOLUTION INTRAMUSCULAR; INTRAVENOUS at 08:01

## 2024-01-24 RX ADMIN — SUCCINYLCHOLINE CHLORIDE 140 MG: 20 INJECTION, SOLUTION INTRAMUSCULAR; INTRAVENOUS at 07:01

## 2024-01-24 RX ADMIN — ROCURONIUM BROMIDE 5 MG: 10 INJECTION, SOLUTION INTRAVENOUS at 07:01

## 2024-01-24 RX ADMIN — EPHEDRINE SULFATE 10 MG: 50 INJECTION, SOLUTION INTRAMUSCULAR; INTRAVENOUS; SUBCUTANEOUS at 08:01

## 2024-01-24 RX ADMIN — VECURONIUM BROMIDE 2 MG: 10 INJECTION, POWDER, LYOPHILIZED, FOR SOLUTION INTRAVENOUS at 10:01

## 2024-01-24 RX ADMIN — MORPHINE SULFATE 2 MG: 2 INJECTION, SOLUTION INTRAMUSCULAR; INTRAVENOUS at 03:01

## 2024-01-24 RX ADMIN — ONDANSETRON 4 MG: 2 INJECTION INTRAMUSCULAR; INTRAVENOUS at 07:01

## 2024-01-24 RX ADMIN — FENTANYL CITRATE 50 MCG: 50 INJECTION, SOLUTION INTRAMUSCULAR; INTRAVENOUS at 07:01

## 2024-01-24 RX ADMIN — DEXTROSE AND SODIUM CHLORIDE: 5; 450 INJECTION, SOLUTION INTRAVENOUS at 02:01

## 2024-01-24 RX ADMIN — SIMETHICONE 160 MG: 80 TABLET, CHEWABLE ORAL at 08:01

## 2024-01-24 RX ADMIN — DIPHENHYDRAMINE HYDROCHLORIDE 6.25 MG: 50 INJECTION INTRAMUSCULAR; INTRAVENOUS at 07:01

## 2024-01-24 RX ADMIN — ACETAMINOPHEN 1000 MG: 10 INJECTION INTRAVENOUS at 10:01

## 2024-01-24 RX ADMIN — DEXAMETHASONE SODIUM PHOSPHATE 4 MG: 4 INJECTION, SOLUTION INTRA-ARTICULAR; INTRALESIONAL; INTRAMUSCULAR; INTRAVENOUS; SOFT TISSUE at 07:01

## 2024-01-24 RX ADMIN — LIDOCAINE HYDROCHLORIDE 100 MG: 20 INJECTION, SOLUTION INTRAVENOUS at 07:01

## 2024-01-24 RX ADMIN — FENTANYL CITRATE 12.5 MCG: 50 INJECTION INTRAMUSCULAR; INTRAVENOUS at 01:01

## 2024-01-24 NOTE — TRANSFER OF CARE
"Anesthesia Transfer of Care Note    Patient: Marylou Bender    Procedure(s) Performed: Procedure(s) (LRB):  ROBOTIC NEPHRECTOMY (Left)    Patient location: PACU    Anesthesia Type: general    Transport from OR: Transported from OR on 6-10 L/min O2 by face mask with adequate spontaneous ventilation    Post pain: adequate analgesia    Post assessment: no apparent anesthetic complications    Post vital signs: stable    Level of consciousness: sedated    Nausea/Vomiting: no nausea/vomiting    Transfer of care protocol was followed      Last vitals: Visit Vitals  BP (!) 142/72 (BP Location: Left arm, Patient Position: Lying)   Temp 36.5 °C (97.7 °F) (Skin)   Resp 18   Ht 5' 7" (1.702 m)   Wt 108.9 kg (240 lb)   LMP  (LMP Unknown)   SpO2 98%   Breastfeeding No   BMI 37.59 kg/m²     "

## 2024-01-24 NOTE — PLAN OF CARE
635- Pre-op complete. Daughter and  at bedside. Text notifications initiated. Pt denies need for safe. Belongings with daughter.

## 2024-01-24 NOTE — ANESTHESIA PROCEDURE NOTES
Intubation    Date/Time: 1/24/2024 7:18 AM    Performed by: Jonathan Webster CRNA  Authorized by: Kaiser Mercado MD    Intubation:     Induction:  Intravenous    Intubated:  Postinduction    Mask Ventilation:  Easy mask    Attempts:  1    Attempted By:  Student    Method of Intubation:  Video laryngoscopy    Blade:  Sanchez 3    Laryngeal View Grade: Grade I - full view of cords      Difficult Airway Encountered?: No      Complications:  None    Airway Device:  Oral endotracheal tube    Airway Device Size:  7.0    Style/Cuff Inflation:  Cuffed (inflated to minimal occlusive pressure)    Tube secured:  22    Secured at:  The lips    Placement Verified By:  Capnometry    Complicating Factors:  None    Findings Post-Intubation:  BS equal bilateral and atraumatic/condition of teeth unchanged

## 2024-01-24 NOTE — BRIEF OP NOTE
[facility] Urology  Brief Operative Note     Date: 01/24/2024     Staff Surgeon: Winston Chand MD     Asst: Simin MIRANDA     Pre-Op Diagnosis: left renal mass     Post-Op Diagnosis: same     Procedure(s) Performed:   L robotic radical nephrectomy (mod 22)       Specimen(s): 1. left kidney and proximal ureter, 2. Retroperitoneal lymph nodes     Anesthesia: General endotracheal anesthesia     Findings:   Exophytic large lower pole mass with significant surrounding neovascularity, sticky abundany medial fat near hilum, some prominent lymph nodes along vasculature from hilum to lower pole removed, and lap lysis of adhesions before placing robotic instruments and docking     Estimated Blood Loss: 500cc     Drains: 18 arellano     Complications: none     Disposition: pacu to floor

## 2024-01-24 NOTE — ANESTHESIA PREPROCEDURE EVALUATION
01/24/2024  Marylou Bender is a 68 y.o., female.      Pre-op Assessment    I have reviewed the Patient Summary Reports.     I have reviewed the Nursing Notes. I have reviewed the NPO Status.   I have reviewed the Medications.     Review of Systems  Anesthesia Hx:  No problems with previous Anesthesia                Social:  Non-Smoker       Hematology/Oncology:  Hematology Normal   Oncology Normal                                   EENT/Dental:  EENT/Dental Normal           Cardiovascular:                hyperlipidemia                             Renal/:  Chronic Renal Disease, CKD   Renal mass      Kidney Function/Disease             Hepatic/GI:        Diverticulitis           Musculoskeletal:  Musculoskeletal Normal                Neurological:  Neurology Normal                                      Endocrine:        Obesity / BMI > 30  Psych:  Psychiatric History anxiety depression                Physical Exam  General: Well nourished, Cooperative, Alert and Oriented    Airway:  Mallampati: II   Mouth Opening: Normal  TM Distance: Normal  Tongue: Normal    Dental:  Intact    Chest/Lungs:  Normal Respiratory Rate, Clear to auscultation    Heart:  Rate: Normal  Rhythm: Regular Rhythm        Anesthesia Plan  Type of Anesthesia, risks & benefits discussed:    Anesthesia Type: Gen ETT  Intra-op Monitoring Plan: Standard ASA Monitors  Post Op Pain Control Plan: multimodal analgesia and IV/PO Opioids PRN  Induction:  IV  Airway Plan: Direct and Video, Post-Induction  Informed Consent: Informed consent signed with the Patient and all parties understand the risks and agree with anesthesia plan.  All questions answered.   ASA Score: 3    Ready For Surgery From Anesthesia Perspective.     .

## 2024-01-24 NOTE — NURSING
Nurses Note -- 4 Eyes      1/24/2024   2:32 PM      Skin assessed during: Admit      [x] No Altered Skin Integrity Present    [x]Prevention Measures Documented      [] Yes- Altered Skin Integrity Present or Discovered   [] LDA Added if Not in Epic (Describe Wound)   [] New Altered Skin Integrity was Present on Admit and Documented in LDA   [] Wound Image Taken    Wound Care Consulted? No    Attending Nurse:  MARIA ESTHER Coleman    Second RN/Staff Member:   MARIA ESTHER Clifford       5 lap sites closed with dermabond and midline abd incision with border dressing CDI noted.

## 2024-01-24 NOTE — PLAN OF CARE
VSS. NAD. Resp E/U. Calm and cooperative. Speech appropriate. Tolerating clear liquids. Denies nausea. Pain controlled. IV x2 patent. No swelling or redness. Incision x5 with dermabond CDI. Midline island dressing CDI. Miranda intact and draining clear yellow urine. Dr. Chand notified of labs. Report given to Jared. Family notified of patient status. All safety measures taken. Bed in low position. Bedrails up x2. Bed locked. Cleared by Anesthesia.

## 2024-01-24 NOTE — ANESTHESIA POSTPROCEDURE EVALUATION
Anesthesia Post Evaluation    Patient: Marylou Bender    Procedure(s) Performed: Procedure(s) (LRB):  ROBOTIC NEPHRECTOMY (Left)    Final Anesthesia Type: general      Patient location during evaluation: PACU  Patient participation: Yes- Able to Participate  Level of consciousness: awake and alert  Post-procedure vital signs: reviewed and stable  Pain management: adequate  Airway patency: patent    PONV status at discharge: No PONV  Anesthetic complications: no      Cardiovascular status: blood pressure returned to baseline  Respiratory status: unassisted and room air  Hydration status: euvolemic  Follow-up not needed.              Vitals Value Taken Time   /71 01/24/24 1432   Temp 36.6 °C (97.9 °F) 01/24/24 1432   Pulse 86 01/24/24 1539   Resp 16 01/24/24 1549   SpO2 96 % 01/24/24 1539         Event Time   Out of Recovery 14:05:00         Pain/To Score: Pain Rating Prior to Med Admin: 8 (1/24/2024  3:49 PM)  Pain Rating Post Med Admin: 7 (1/24/2024  2:00 PM)  To Score: 8 (1/24/2024  2:00 PM)

## 2024-01-24 NOTE — H&P
[facility] Urology New Patient/H&P:      Marylou Bender is a 68 y.o. female who presents for evaluation of gross hematuria     Had presentation 11/28/23 to urgent care for hematuria noting  Patient with past medical history of CKD 3 presents to clinic with complaints of asia hematuria that began last night.  Denies abdominal pain.  Denies burning with urination.  Does endorse 1 episode of nausea without vomiting. Denies urgency, frequency, abdominal pain  Udip blood positive     She reports that last Tuesday after an active day of lifting and climbing ladders she noted a twinge in her back twice and then when she sat down had some pressure and when voiding had some dark and floating elements and then saw blood on her pad.  She has some discomfort and took 3 Pepcid, two Tylenol, and had mostly had coffee to drink with no water.  Urine the next morning was pink with clots at which time she presented to urgent care.  On arrival to urgent Care her urine was yellow even though her urinalysis dipstick was positive for blood as above, however she did void 1 more time before leaving her urgent care visit which had asia blood and had the visualize it.  Since then her urine has been yellow, had some tiny floaters within it but has otherwise cleared.     She has no personal or family history of kidney stones.  No known family history of  malignancy.  However her mother had CKD-4  She is a non/never smoker  She had a hysterectomy with appendectomy at age 38 from endometriosis noting significant scarring and was advised she may need resection or follow-up in the future, but has had no issues.  No blood thinners.  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  She is utilize steroids, antibiotics, mouthwash.  Despite reported history of CKD 3, her last renal function on file from May of 2023 is normal with creatinine of 1.1 and EGFR greater  than 60, however three-month prior did note an EGFR 55.1     .           Past Medical History:   Diagnosis Date    CKD (chronic kidney disease) stage 3, GFR 30-59 ml/min      Diverticulitis      Fluttering heart      PTSD (post-traumatic stress disorder)      Stress incontinence (female)                 Past Surgical History:   Procedure Laterality Date    APPENDECTOMY        BUNIONECTOMY Left       SECTION        COLONOSCOPY W/ POLYPECTOMY   2023     COLON REPORT   5YR RECALL    [facility]    HYSTERECTOMY        TONSILLECTOMY                   Family History   Problem Relation Age of Onset    Cancer Mother           colon    Thyroid disease Mother      Kidney disease Mother      Heart disease Mother      Stroke Mother      Diabetes Mother      Heart disease Father      Thyroid disease Sister      Cancer Maternal Grandmother 62         Colon         Social History               Socioeconomic History    Marital status:     Number of children: 2   Occupational History    Occupation: Retired       Comment:    Tobacco Use    Smoking status: Never    Smokeless tobacco: Never   Substance and Sexual Activity    Alcohol use: Yes       Comment: rarely    Drug use: Never    Sexual activity: Yes       Partners: Male      Social Determinants of Health           Financial Resource Strain: Low Risk  (2023)     Overall Financial Resource Strain (CARDIA)      Difficulty of Paying Living Expenses: Not very hard   Food Insecurity: No Food Insecurity (2023)     Hunger Vital Sign      Worried About Running Out of Food in the Last Year: Never true      Ran Out of Food in the Last Year: Never true   Transportation Needs: No Transportation Needs (2023)     PRAPARE - Transportation      Lack of Transportation (Medical): No      Lack of Transportation (Non-Medical): No   Physical Activity: Inactive (2023)     Exercise Vital Sign      Days of Exercise per  "Week: 0 days      Minutes of Exercise per Session: 0 min   Stress: Stress Concern Present (11/28/2023)     [employer] [place] [employer] - Occupational Stress Questionnaire      Feeling of Stress : Very much   Social Connections: Unknown (11/28/2023)     Social Connection and Isolation Panel [NHANES]      Frequency of Communication with Friends and Family: More than three times a week      Frequency of Social Gatherings with Friends and Family: More than three times a week      Active Member of Clubs or Organizations: Patient refused      Attends Club or Organization Meetings: Patient refused      Marital Status:    Housing Stability: Low Risk  (11/28/2023)     Housing Stability Vital Sign      Unable to Pay for Housing in the Last Year: No      Number of Places Lived in the Last Year: 2      Unstable Housing in the Last Year: No                 Review of patient's allergies indicates:   Allergen Reactions    Nsaids (non-steroidal anti-inflammatory drug)           Medications Reviewed: see MAR     Focused Physical Exam         Vitals:     12/04/23 1338   BP: 138/88   Pulse: 73      Body mass index is 37.6 kg/m². Weight: 108.9 kg (240 lb 1.3 oz) Height: 5' 7" (170.2 cm)         Abdomen: Soft, non-tender, nondistended, no CVA tenderness     RRR  CTAB    10 pt ros neg except as noted    LABS:     Recent Results         Recent Results (from the past 336 hour(s))   POCT Urinalysis, Dipstick, Automated, W/O Scope     Collection Time: 11/28/23  6:21 PM   Result Value Ref Range     POC Blood, Urine Positive (A) Negative     POC Bilirubin, Urine Negative Negative     POC Urobilinogen, Urine 0.2 0.1 - 1.1     POC Ketones, Urine Negative Negative     POC Protein, Urine Negative Negative     POC Nitrates, Urine Negative Negative     POC Glucose, Urine Negative Negative     pH, UA 6.0       POC Specific Gravity, Urine 1.010 1.003 - 1.029     POC Leukocytes, Urine Negative Negative   POCT Urinalysis(Instrument) "     Collection Time: 12/04/23  1:56 PM   Result Value Ref Range     Color, POC UA Yellow Yellow, Straw, Colorless     Clarity, POC UA Clear Clear     Glucose, POC UA Negative Negative     Bilirubin, POC UA Negative Negative     Ketones, POC UA Negative Negative     Spec Grav POC UA 1.010 1.005 - 1.030     Blood, POC UA Trace-intact (A) Negative     pH, POC UA 6.0 5.0 - 8.0     Protein, POC UA Negative Negative     Urobilinogen, POC UA 0.2 <=1.0     Nitrite, POC UA Negative Negative     WBC, POC UA Negative Negative               Assessment/Diagnosis:     1. Gross hematuria  POCT Urinalysis(Instrument)     Cytology, Urine     Procedure Order to Urology     CT Urogram Abd Pelvis W WO     Creatinine, Serum     Urine culture             Plans:  Extensive review of medical record including recent workup evaluation and management by urgent care for her gross hematuria.  Has resolved since that time but urine still demonstrates trace blood.  Non/never smoker, no significant risk factors.  We did review that the workup for gross hematuria, isolated singular, or recurrent, is the same.  Urine studies to include urine culture to rule out infection, of which I am not suspicious of as he has no lower tract symptoms and urine has only been positive for blood, no leukocytes or nitrites, as well as urine cytology.  CT urogram for upper tract evaluation noting a non contrasted phase will rule in or out any kidney stones.  There is some suspicion of this given her abdominal pressure pain debris in urine prior to gross hematuria, however no known history, and unlikely to be recurrent lower tract bleeding with clots.  We discussed the need for cystoscopic evaluation for lower tract and bladder evaluation to rule out any lower tract source as well.  Procedure in detail diagnostic flexible cystourethroscopy was reviewed in detail including the use of local anesthesia with xylocaine jelly, all questions answered, and patient agreeable to  proceed.  Cystoscopy was scheduled on 12/12/23 at the [facility] per patient request, and CT urogram with creatinine on arrival to be scheduled prior.  I did review the noted CKD 3 in her chart however her last renal function on file was quite normal not indicative of chronic kidney disease, though the 1 prior was.  Even such at that value, CT urogram contrast would likely be acceptable and advise patient to remain well hydrated before and after.  Will chart check results of CT urogram and contact patient if any management changes otherwise will see patient for cystoscopy as planned to complete workup and discuss further plans for follow-up    12/13 cysto  small urethral polyps, mild erythema at bladder neck on retroflexion, negative bladder     CTU with large solid enhancing left renal mass most consistent with RCC with renal vein invasion.     No lower tract source for gross hematuria.  Given significant renal mass with renal vein invasion and impingement on collecting system, with patient noted to be dehydrated and active at time of gross hematuria, likely was irritation from upper tract source and involvement of renal mass to collecting system.  On review of imaging with patient and family, noted parenchymal origin with the extent into vein and pushing into collecting system, and is most consistent with RCC, not urothelial, and cytology is negative.     Extensive record review imaging with patient and family denoting her large >6cm lower pole left renal mass, solid enhancing and with irregular appearance concerning for RCC, extending centrally into collecting system and with extent into renal vein. We did discuss that approximately 80-85% or greater of solid enhancing renal masses represent a renal cell carcinoma, and surgical excision of these masses is the preferred treatment strategy, with nephron-sparing approach/partial nephrectomy preferred when possible.  However given size and location of this mass and anatomic  concerns especially extent from lower to mid pole and into collecting system, and concern for T3 renal vein invasion, this would yield anatomic challenges to partial nephrectomy, and given these characteristics, radical nephrectomy would be recommended.  We discussed robot-assisted laparoscopic approach in contrast to open approach, and after risk benefit discussion he elected to proceed with robotic/lap right radical nephrectomy.      Risks and benefits of right lap/robotic radical nephrectomy discussed in detail, including but not limited to pain, infection, bleeding, scarring, injury to surrounding structures (spleen, colon, diaphragm, etc), hernia, postoperative ileus, declining renal function, need for conversion to open procedure. Also discussed adrenal sparing vs non, and potential effect on blood pressure if adrenal removed.   Reviewed longterm risk of CKD and possible need for nephrologic evaluation and follow up after - low risk given normal contralateral kidney and overall good health w/o htn.dm.      After extensive discussion, all questions answered and informed consent obtained and she elected to proceed with left robotic radical nephrectomy on 1/17/24.   - will get MRI abdomen w/wo for further assessment of venous involvement and CT chest for staging prior    No concern on ct chest  MRI abdomen with extent to renal vein at hilum but more distal vein patent    Proceed with left nephrectomy

## 2024-01-25 VITALS
OXYGEN SATURATION: 98 % | HEIGHT: 67 IN | SYSTOLIC BLOOD PRESSURE: 129 MMHG | WEIGHT: 240 LBS | HEART RATE: 76 BPM | RESPIRATION RATE: 17 BRPM | BODY MASS INDEX: 37.67 KG/M2 | DIASTOLIC BLOOD PRESSURE: 60 MMHG | TEMPERATURE: 98 F

## 2024-01-25 DIAGNOSIS — N28.89 LEFT RENAL MASS: Primary | ICD-10-CM

## 2024-01-25 LAB
ANION GAP SERPL CALC-SCNC: 9 MMOL/L (ref 8–16)
BASOPHILS # BLD AUTO: 0.01 K/UL (ref 0–0.2)
BASOPHILS NFR BLD: 0.2 % (ref 0–1.9)
BUN SERPL-MCNC: 13 MG/DL (ref 8–23)
CALCIUM SERPL-MCNC: 7.9 MG/DL (ref 8.7–10.5)
CHLORIDE SERPL-SCNC: 103 MMOL/L (ref 95–110)
CO2 SERPL-SCNC: 26 MMOL/L (ref 23–29)
CREAT SERPL-MCNC: 1.4 MG/DL (ref 0.5–1.4)
DIFFERENTIAL METHOD BLD: ABNORMAL
EOSINOPHIL # BLD AUTO: 0 K/UL (ref 0–0.5)
EOSINOPHIL NFR BLD: 0.2 % (ref 0–8)
ERYTHROCYTE [DISTWIDTH] IN BLOOD BY AUTOMATED COUNT: 14.1 % (ref 11.5–14.5)
EST. GFR  (NO RACE VARIABLE): 41 ML/MIN/1.73 M^2
GLUCOSE SERPL-MCNC: 115 MG/DL (ref 70–110)
HCT VFR BLD AUTO: 29.8 % (ref 37–48.5)
HCT VFR BLD AUTO: 30.8 % (ref 37–48.5)
HGB BLD-MCNC: 9.6 G/DL (ref 12–16)
HGB BLD-MCNC: 9.8 G/DL (ref 12–16)
IMM GRANULOCYTES # BLD AUTO: 0.01 K/UL (ref 0–0.04)
IMM GRANULOCYTES NFR BLD AUTO: 0.2 % (ref 0–0.5)
LYMPHOCYTES # BLD AUTO: 0.8 K/UL (ref 1–4.8)
LYMPHOCYTES NFR BLD: 14.9 % (ref 18–48)
MAGNESIUM SERPL-MCNC: 1.9 MG/DL (ref 1.6–2.6)
MCH RBC QN AUTO: 28.2 PG (ref 27–31)
MCHC RBC AUTO-ENTMCNC: 31.8 G/DL (ref 32–36)
MCV RBC AUTO: 89 FL (ref 82–98)
MONOCYTES # BLD AUTO: 0.6 K/UL (ref 0.3–1)
MONOCYTES NFR BLD: 10.5 % (ref 4–15)
NEUTROPHILS # BLD AUTO: 4 K/UL (ref 1.8–7.7)
NEUTROPHILS NFR BLD: 74 % (ref 38–73)
NRBC BLD-RTO: 0 /100 WBC
PHOSPHATE SERPL-MCNC: 3 MG/DL (ref 2.7–4.5)
PLATELET # BLD AUTO: 177 K/UL (ref 150–450)
PMV BLD AUTO: 9.8 FL (ref 9.2–12.9)
POTASSIUM SERPL-SCNC: 4.2 MMOL/L (ref 3.5–5.1)
RBC # BLD AUTO: 3.47 M/UL (ref 4–5.4)
SODIUM SERPL-SCNC: 138 MMOL/L (ref 136–145)
WBC # BLD AUTO: 5.44 K/UL (ref 3.9–12.7)

## 2024-01-25 PROCEDURE — 85018 HEMOGLOBIN: CPT | Performed by: UROLOGY

## 2024-01-25 PROCEDURE — 94799 UNLISTED PULMONARY SVC/PX: CPT | Mod: XB

## 2024-01-25 PROCEDURE — 85025 COMPLETE CBC W/AUTO DIFF WBC: CPT | Performed by: UROLOGY

## 2024-01-25 PROCEDURE — 84100 ASSAY OF PHOSPHORUS: CPT | Performed by: UROLOGY

## 2024-01-25 PROCEDURE — 85014 HEMATOCRIT: CPT | Performed by: UROLOGY

## 2024-01-25 PROCEDURE — 94761 N-INVAS EAR/PLS OXIMETRY MLT: CPT

## 2024-01-25 PROCEDURE — 25000003 PHARM REV CODE 250: Performed by: UROLOGY

## 2024-01-25 PROCEDURE — 80048 BASIC METABOLIC PNL TOTAL CA: CPT | Performed by: UROLOGY

## 2024-01-25 PROCEDURE — 27000221 HC OXYGEN, UP TO 24 HOURS

## 2024-01-25 PROCEDURE — 36415 COLL VENOUS BLD VENIPUNCTURE: CPT | Performed by: UROLOGY

## 2024-01-25 PROCEDURE — 83735 ASSAY OF MAGNESIUM: CPT | Performed by: UROLOGY

## 2024-01-25 PROCEDURE — 63600175 PHARM REV CODE 636 W HCPCS: Mod: JZ,JG | Performed by: UROLOGY

## 2024-01-25 RX ORDER — DOCUSATE SODIUM 100 MG/1
100 CAPSULE, LIQUID FILLED ORAL 2 TIMES DAILY
Refills: 0 | COMMUNITY
Start: 2024-01-25

## 2024-01-25 RX ORDER — SIMETHICONE 80 MG
160 TABLET,CHEWABLE ORAL 3 TIMES DAILY PRN
Refills: 0 | COMMUNITY
Start: 2024-01-25 | End: 2024-03-04

## 2024-01-25 RX ORDER — TRAMADOL HYDROCHLORIDE 50 MG/1
50 TABLET ORAL EVERY 6 HOURS PRN
Qty: 15 TABLET | Refills: 0 | Status: SHIPPED | OUTPATIENT
Start: 2024-01-25 | End: 2024-03-04

## 2024-01-25 RX ORDER — POLYETHYLENE GLYCOL 3350 17 G/17G
17 POWDER, FOR SOLUTION ORAL DAILY
Refills: 0 | COMMUNITY
Start: 2024-01-26 | End: 2024-03-04

## 2024-01-25 RX ORDER — ACETAMINOPHEN 325 MG/1
650 TABLET ORAL EVERY 6 HOURS PRN
Refills: 0 | COMMUNITY
Start: 2024-01-25

## 2024-01-25 RX ADMIN — ALPRAZOLAM 0.5 MG: 0.25 TABLET ORAL at 12:01

## 2024-01-25 RX ADMIN — MUPIROCIN: 20 OINTMENT TOPICAL at 08:01

## 2024-01-25 RX ADMIN — TRAMADOL HYDROCHLORIDE 50 MG: 50 TABLET, COATED ORAL at 09:01

## 2024-01-25 RX ADMIN — POLYETHYLENE GLYCOL (3350) 17 G: 17 POWDER, FOR SOLUTION ORAL at 08:01

## 2024-01-25 RX ADMIN — DOCUSATE SODIUM 100 MG: 100 CAPSULE, LIQUID FILLED ORAL at 08:01

## 2024-01-25 RX ADMIN — SERTRALINE HYDROCHLORIDE 100 MG: 50 TABLET ORAL at 08:01

## 2024-01-25 RX ADMIN — ACETAMINOPHEN 325MG 650 MG: 325 TABLET ORAL at 04:01

## 2024-01-25 RX ADMIN — CEFAZOLIN SODIUM 2 G: 2 SOLUTION INTRAVENOUS at 04:01

## 2024-01-25 RX ADMIN — SIMETHICONE 160 MG: 80 TABLET, CHEWABLE ORAL at 08:01

## 2024-01-25 RX ADMIN — ACETAMINOPHEN 1000 MG: 10 INJECTION INTRAVENOUS at 06:01

## 2024-01-25 RX ADMIN — ACETAMINOPHEN 325MG 650 MG: 325 TABLET ORAL at 12:01

## 2024-01-25 RX ADMIN — SIMETHICONE 160 MG: 80 TABLET, CHEWABLE ORAL at 03:01

## 2024-01-25 RX ADMIN — CEFAZOLIN SODIUM 2 G: 2 SOLUTION INTRAVENOUS at 12:01

## 2024-01-25 RX ADMIN — TRAMADOL HYDROCHLORIDE 50 MG: 50 TABLET, COATED ORAL at 03:01

## 2024-01-25 NOTE — NURSING
Ambulated 250 feet in hallway tolerated well voided 200 ml clear yellow urine with out complications, Passed more gas verbalizes tramadol helped with Pain

## 2024-01-25 NOTE — NURSING
Assisted patient to sit at edge of bed. Patient immediately became nauseated and requested to lie back in bed.

## 2024-01-25 NOTE — DISCHARGE INSTRUCTIONS
Eat balanced meals stay hydrated Shower bathe only monitor incision sites for redness swelling or drainage if these occur call Dr Chand Office or go to nearest emergency room, If you develop unrelieved pain fever or painful urination  call Dr Chand office or go to nearest emergency room avoid driving until seen by Dr Chand

## 2024-01-25 NOTE — NURSING
Patient stood at bedside, tolerated well. Ambulated down tinsley, tolerated well. Belching. IV Tylenol infusing. IS encouraged. Took few sips of apple juice.

## 2024-01-25 NOTE — PLAN OF CARE
POC discussed with patient, verbalized understanding. Patient with uneventful night, slept off and on between care. VS stable. Ambulated in tinsley multiple times. Abdominal surgical pain managed well. Miranda with good output. IVF infusing, ABX received. IS used multiple times. No further complaints of nausea, tolerated clears. SCD in place. 02@2L/NC in use. Family at bedside.

## 2024-01-25 NOTE — NURSING
Ambulated down tinsley tolerated very well. Miranda with clear yellow urine. Tylenol for complaints of abdominal discomfort.

## 2024-01-25 NOTE — NURSING
Miranda catheter removed discarded 500 ml clear yellow urine IVF discontinued assisted up to chair at bed side tolerated well Daughter present @ bed side Left abdominal lap sites CDI no redness swelling or drainage noted mid line dressing CDI

## 2024-01-25 NOTE — NURSING
Patient stood at bedside for about 5 minutes, unable to take any steps at this time due to pain and dizziness. Complaining of increased L sided abdominal pain. Complaining of nausea, unable to drink water from a straw, she gags. Only able to take ice chips. States pain 10/10. Refused Tylenol and Tramadol due to nausea. Patient unable to get comfortable. Requesting pain medication. Medicated with Morphine as ordered. Zofran for nausea. Simethicone as ordered for gas pain. States belched when she stood up. IS encouraged . 02@2L/NC in use. Miranda with clear yellow urine.

## 2024-01-25 NOTE — NURSING
Patient stood at edge of bed with assistance x1. Took 3 steps in room but went back to bed due to nausea and dizziness.

## 2024-01-25 NOTE — CARE UPDATE
01/24/24 2009   Patient Assessment/Suction   Level of Consciousness (AVPU) alert   Respiratory Effort Normal;Unlabored   Expansion/Accessory Muscles/Retractions expansion symmetric;no retractions;no use of accessory muscles   PRE-TX-O2   Device (Oxygen Therapy) nasal cannula   Flow (L/min) 2   Oxygen Concentration (%) 28   SpO2 98 %   Pulse Oximetry Type Intermittent   Pulse 86   Resp 18   Incentive Spirometer   $ Incentive Spirometer Charges done with encouragement   Administration (IS) proper technique demonstrated   Number of Repetitions (IS) 5   Level Incentive Spirometer (mL) 1500   Patient Tolerance (IS) good   Ready to Wean/Extubation Screen   FIO2<=50 (chart decimal) 0.28

## 2024-01-25 NOTE — NURSING
Ambulated down tinsley X 2, tolerated well. Belching. Tylenol po for continued complaints of discomfort.

## 2024-01-26 NOTE — DISCHARGE SUMMARY
[facility] Med/Surg  Urology  Discharge Summary      Patient Name: Marylou Bender  MRN: [id number]  Admission Date: 1/24/2024  Hospital Length of Stay: 1 days  Discharge Date and Time: 1/25/2024  3:09 PM  Attending Physician: Winston Chand MD  Discharging Provider: Winston Chand MD  Primary Care Physician: Bj Grimm III, MD    HPI:   69 yo F who had workup for 3 episodes isolated gross hematuria and found to have 6-7cm left lower pole renal mass concerning for RCC with early extent in to renal vein    Procedure(s) (LRB):  ROBOTIC NEPHRECTOMY (Left)     Indwelling Lines/Drains at time of discharge:   Lines/Drains/Airways       None                   Hospital Course   Uncomplicated  Convalesced well on med surg floor  Initial POD 0 mild expected anemia and slight GFR decline as through Cr 1.3-1.4, eGFR 40s. Drowsy much of afternoon on pod 0 and declined oral pain meds given dry mouth diff/swallowing and largely controlled IV though by 8pm got a bit behind and was in pain, nauseated, anxious and after iv meds, xanax, and evening ambulation improved greatly. By AM POD 1 had ambulated overnight, UOP >2L clear, arellano removed, H/H stable with minimal dilutional downtrend, and VSS WNL. Abd soft, approp tender minimally, nondistended. Noted +flatus. Midline dressing removed. Expected bruising. All lap sites CDI. Sat to side of bed for breakfast, remained ambulatory, pain controled with oral prns only, continued to be ambulatory passing flatus, and repeat H/H noon stable and jaime lunch and felt much improved. Voided. Still +flatus. Abd nondistended. All DC recs and plan reviewed at bedside with pt and daughter/    Pending Diagnostic Studies:       Procedure Component Value Units Date/Time    Specimen to Pathology - Surgery [[id number]] Collected: 01/24/24 1059    Order Status: Sent Lab Status: No result     Specimen: Tissue             Final Active Diagnoses:    Diagnosis Date Noted POA     "PRINCIPAL PROBLEM:  Left renal mass [N28.89] 01/25/2024 Yes      Problems Resolved During this Admission:         Discharged Condition: good    Disposition: Home or Self Care    Follow Up:   Follow-up Information       Winston Chand MD Follow up on 2/5/2024.    Specialty: Urology  Why: postop fu  Contact information:  [address] [place] [address] DR ZENG Gee  [place] [address]  [phone]               [facility] - Lab Follow up on 2/2/2024.    Specialty: Lab  Contact information:  [address] [place] [address] [address] [place] [address]                         Patient Instructions:      No dressing needed   Order Comments: Will see surgical glue over incisions eventually start to peel, then ok at that time to assist in removing in shower     Activity as tolerated   Order Comments: 1. Daily bowel regimen until regular again. May not have a BM for a few days. Ok as long as tolerating diet and passing gas - use Colace 100mg 2x/day stool softener (start this evening) and Miralax 1 cap daily (start in AM). Both are OTC. Continue stool softeners and miralax until regular again, though If no BM in 3-4 days at home, take milk of magnesia as directed. Do not push or strain.   2. No lifting/pushing/pulling >10 lbs x 4 weeks  3. No driving if needing Rx pain meds  4. No aspirin/fish oil/[place]/bloodthinners x48 hours   5. Ok to shower today - pat incisions dry. No baths/soaks. Can help "surgical glue" peel off in shower starting in 1 week once starts to flake up. If not flaking after 1 week start to peel from edges. Stitches will dissolve on their own. No ointments  - may not flake up on its own by 1 week but after 1 week can start to help it along  6. Drink a lot of water  7. Take incentive spiromoter home and continue use to prevent pneumonia  8. Avoid NSAIDS (advil/motrin/aleve/toradol) and keep to strict low sodium diet (<2000mg/day) and INCREASE water intake     Medications:  Reconciled Home Medications:    "   Medication List        START taking these medications      acetaminophen 325 MG tablet  Commonly known as: TYLENOL  Take 2 tablets (650 mg total) by mouth every 6 (six) hours as needed for Pain.     docusate sodium 100 MG capsule  Commonly known as: COLACE  Take 1 capsule (100 mg total) by mouth 2 (two) times daily.     polyethylene glycol 17 gram Pwpk  Commonly known as: GLYCOLAX  Take 17 g by mouth once daily.  Start taking on: January 26, 2024     simethicone 80 MG chewable tablet  Commonly known as: MYLICON  Take 2 tablets (160 mg total) by mouth 3 (three) times daily as needed (gas pain bloating).     traMADoL 50 mg tablet  Commonly known as: ULTRAM  Take 1 tablet (50 mg total) by mouth every 6 (six) hours as needed (pain not relieved by  otc).            CONTINUE taking these medications      ALPRAZolam 0.5 MG tablet  Commonly known as: XANAX  Take 1 tablet (0.5 mg total) by mouth 3 (three) times daily as needed for Anxiety.     sertraline 100 MG tablet  Commonly known as: ZOLOFT  TAKE 1 & 1/2 (ONE & ONE-HALF) TABLETS BY MOUTH ONCE DAILY     traZODone 50 MG tablet  Commonly known as: DESYREL  Take 1 tablet (50 mg total) by mouth every evening.            ASK your doctor about these medications      doxycycline 50 MG capsule  Take 1 capsule (50 mg total) by mouth 2 (two) times daily.              Time spent on the discharge of patient: 45 minutes    Winston Chand MD  Urology  [facility]/Surg

## 2024-01-27 NOTE — OP NOTE
[facility]     Date: 1/24/24     Staff Surgeon: Winston Chand MD     Bedside Assistant: RUBEN Fletcher     Pre-Op Diagnosis: left renal mass     Post-Op Diagnosis: same     Procedure(s) Performed:   Left robot-assisted laparoscopic radical nephrectomy (mod. 22)     Specimen(s):   1. left kidney and proximal ureter  2. retroperitoneal lymph nodes     Anesthesia: General endotracheal anesthesia     Findings:   - Exophytic large lower pole mass with significant surrounding neovascularity prolonging dissection for vascular control   - Sticky abundant medial fat near hilum increasing time and complexity of hilar dissection and transection, including dissection as distal as possible on vein to ensure well beyond tumor thrombus margin, of which no distinct thrombus seen and vein compressible at point of transection  - proximity of adrenal gland and its vasculature to and adherence to hilar dissection with sticky medial fat did not allow for adrenal-sparing  - Some prominent lymph nodes along vasculature from hilum to lower pole removed  - Laparoscopic lysis of adhesions before placing robotic instruments and docking secondary to mesenteric upper quadrant attachments  - central abdominal obesity of which long assistant trocars were at max reach, and had to abandon lower pole assistant port secondary to this  given above anatomic factors prolonging dissection and increasing operative time and complexity beyond usual, including non-robotic portion for specimen extraction and closure given body habitus, modifier 22 should be applied     Estimated Blood Loss: 500cc     Drains: 18 fr arellano     Complications: none     Indications for procedure:  67yo F with incidental finding of large left lower pole renal mass with central extent including into renal vein at hilu most consisten with RCC based on imaging, found on workup of isolated gross hematuria. After extensive discussion of management options and  surgical options and anatomic considerations, patient elected to proceed with robotic radical nephrectomy.   After extensive discussion of risks, benefits, alternatives, as well as risk of conversion to open procedure, all questions the patient had were answered and appropriate informed consent was obtained.       PROCEDURE IN DETAIL:  After obtaining fully informed consent, the patient was placed in the left flank position after placing an 18fr arellano catheter under sterile technique. She was then secured to the bed and all appropriate pressure points were padded. A test roll was performed prior to the procedure. Preoperative antibiotics were given and a WHO timeout was performed.      Veress needle placed percutaneously in subcostal plane and after saline drop test, was used to insufflate up to 15mmHg. Four 8 mm robotic trocar sites were then marked out 8 cm apart in a straight line starting 2 finger breaths inferior to the costal margin along the right lateral abdomen.  8 mm trocar was placed through the 2nd from superior incision after which endoscope confirmed successful intraperitoneal placement. At this time, the remainder of the 8mm robotic trocars were placed in marked sites under direct vision and then 12mm port placed under direct vision in midline upper quadrant and 5mm lower midline supraumbilical.  Mild lateral wall colon adhesions.      Once all ports were placed, the robot was docked over the patient's left shoulder. However unable to place robotic instruments safely in targeted surgical site secondary to band of adhesions so robot undocked and moved back and laparoscopic lysis of adhesions was performed with endo kiran and grasper to drop the band of mesenteric adhesions to the lateral wall to clearly define the planes of spleen kidney and colon for appropriate dissection.    Once the robot was docked, we then incised the line of Toldt, dropping the left colon from the field.  We then began by  elevating the lower pole of the kidney.  After bowel was dropped, the gonadal vein was seen distinctly in the medial fat, and the ureter was seen peristalsing.  A horizontal scoring easton was performed using monopolar scissors. Sweeping laterally, we were able to identify the ureter over the psoas and clearly see psoas muscle and tendon. 4th robotic arm was used to retract the ureter towards the anterior abdominal wall, placing upward traction on the lower pole of the kidney.  Gonadal was identified and and dropped to follow towards renal vein to facilitate hilar identification. As noted above this dissection was tenuous and prolonged secondary to the significant neovascularity feeding the distinctly seen lower pole exophytic mass.  The mass did not seem to violate Gerota's however there were extensive vascular complexes requiring vessel sealer for adequate control and a prolonged dissection to elevate the lower pole and isolate these structures.     We then systematically marched up from the lower pole of the kidney to the dissection of the hilum. Small pockets of tissue were cauterized using the bipolar to assist in gaining hemostatic exposure to the renal vasculature.  Again, this dissection was quite prolonged and tenuous secondary to the significant neovascularity and sticky medial fat.  Vessel sealer was used to control aberrant vascular complexes.  As well, it was unclear if there was bulky lymph node tissue verses clumps of sticking medial fat, however along this plane following the gonadal up to the hilum, some retroperitoneal lymph nodes were hemostatically excised using vessel sealer at the pedicles and removed for sampling.  The renal vein was skeletonized as a landmark. After dissection of the inferior borders of the renal vein, the artery was noted posterior to the vein. Sticky medial fat cleared from branching venous complex of flaca near vein insertion, though in order to reach this area it was  necessary to hemostatically transect the gonadal vein with clips far enough away from the hilum that it was not in the way of the hilar dissection.  The main hilum was adherent circumferentially and in close proximity to the adrenal superiorly.  It was necessary to get a good medial/distal margin of the renal vein secondary to concern for renal vein extension, and renal vein was carefully dissected distally towards its origin, until it was distinctly compressible and no tumor thrombus was seen.  The artery was posterior with a complex medial fat and not distinct, however in order to reach it was necessary to take the vein 1st.. Powered 35 mm Endo-JANIE stapler used as 1 staple load to take the renal vein to clear plane of dissection for the main renal artery.  This was taken 2nd, and was hemostatically transected.  Hilar dissection and transection of vasculature was also complex as given her body habitus and angles to reach this area, the lower pole assistant port became unusable and needed to be backed out and could not traverse over:  To this area, so delicate repositioning of robotic arms and 4th arm with 1 assistant suction in the setting of her adherent medial fat increase the length and complexity of this dissection.    At this point, with the hilum transected, and most of the medial tissue free as well, we focused on the remaining attachments to the kidney. Attention was turned back to the lower pole and the ureter was identified and skeletonized free of surrounding periureteral tissue.  The ureter was transected hemostatically between hemolock clips.  Adrenal tissue was identified and however given adherence and hilar complexity and amount of medial fat adherent to Gerotas at this area could not be dissected free in hemostatic fashion around the upper pole so was included in the specimen to not further violate Gerotas and increase bleeding, which had finally been controlled with vessel sealer..  Lateral  attachments were then taken down largely bluntly tracing from the upper pole towards the lower pole.  Attention was turned medially to facilitate dissection of the remaining splenorenal and upper pole attachments after which the kidney was freely mobile.      A 15 mm Endo-Catch bag was passed through the assistant port in used to ensnare the kidney and proximal ureter specimen, and once confirmed within the bag and the bag was sealed, and was pushed towards the pelvis for inspection of the hilar bed, noted to be hemostatic, and any remaining bladder blood products and clot were suctioned free and irrigated free from the abdomen at this time.  A risk to powder was then placed over the hilar vascular stumps and bed of resection for additional hemostasis.  The robot was undocked and midline incision created extending the 12 mm assistant port site superiorly and inferiorly along the previously marked midline to serve as extraction site, and Bovie electrocautery was used to dissect down through fat and fascial layers to enter the abdomen. The Endo Catch bag with specimen within it was manually removed. The fascia was then closed in a running fashion using a 0-PDS suture.  Given skin to fascia depth, this dissection was prolonged as well, and once the specimen was removed in the fascia was closed, this midline extraction site incision was closed in multiple layers, with at least 2 layers of 2-0 Vicryl before skin closure.  However, once the fascia was closed and the specimen was extracted, the abdomen was reinsufflated and inspected.  Further blood clots residual from the procedure were suction free and irrigated out at this time, but all better resection was hemostatic and the were no intra-abdominal concerns, and the fascial closure was noted to be tight with no leak.  The abdomen was then de-sufflated via uncapping all ports and removing as much gas from the abdomen as possible and then all ports were removed.     The  extraction site incision was then irrigated and closed in multiple layers using 2 Vicryl  Off midline to close potential space as well as deep dermal sutures in a layer just overlying the fascial closure, and then skin incision 4 0 Monocryl subcuticular suture. All remaining skin incisions closed with 4 0 monocryl subcuticular suture.  Before closure, 0.5% Marcaine was injected into all of the incisions, including at the fascial level at the extraction site incision as well as skin level.  Dermabond was applied over all the incisions and after it dried, pressure dressing of 4 x 8 and island dressing over it was applied to extraction site incision. Her Miranda catheter was secured to thigh with a StatLock and placed to gravity drainage.       The patient tolerated the procedure well, there were no complications, and he was extubated and taken to PACU in stable condition.          Disposition:  The patient will be kept overnight.  Miranda catheter will be removed in the morning.  Discharge is pending ambulation, tolerating regular diet, by mouth pain control, return of bowel function. Expected POD 1

## 2024-01-30 ENCOUNTER — PATIENT MESSAGE (OUTPATIENT)
Dept: UROLOGY | Facility: CLINIC | Age: 69
End: 2024-01-30
Payer: MEDICARE

## 2024-02-02 ENCOUNTER — LAB VISIT (OUTPATIENT)
Dept: LAB | Facility: HOSPITAL | Age: 69
End: 2024-02-02
Attending: UROLOGY
Payer: MEDICARE

## 2024-02-02 DIAGNOSIS — N28.89 LEFT RENAL MASS: ICD-10-CM

## 2024-02-02 LAB
ANION GAP SERPL CALC-SCNC: 8 MMOL/L (ref 8–16)
BASOPHILS # BLD AUTO: 0.02 K/UL (ref 0–0.2)
BASOPHILS NFR BLD: 0.3 % (ref 0–1.9)
BUN SERPL-MCNC: 19 MG/DL (ref 8–23)
CALCIUM SERPL-MCNC: 8.9 MG/DL (ref 8.7–10.5)
CHLORIDE SERPL-SCNC: 103 MMOL/L (ref 95–110)
CO2 SERPL-SCNC: 27 MMOL/L (ref 23–29)
CREAT SERPL-MCNC: 1.4 MG/DL (ref 0.5–1.4)
DIFFERENTIAL METHOD BLD: ABNORMAL
EOSINOPHIL # BLD AUTO: 0.4 K/UL (ref 0–0.5)
EOSINOPHIL NFR BLD: 5.1 % (ref 0–8)
ERYTHROCYTE [DISTWIDTH] IN BLOOD BY AUTOMATED COUNT: 14.4 % (ref 11.5–14.5)
EST. GFR  (NO RACE VARIABLE): 41 ML/MIN/1.73 M^2
GLUCOSE SERPL-MCNC: 103 MG/DL (ref 70–110)
HCT VFR BLD AUTO: 34.9 % (ref 37–48.5)
HGB BLD-MCNC: 11.2 G/DL (ref 12–16)
IMM GRANULOCYTES # BLD AUTO: 0.04 K/UL (ref 0–0.04)
IMM GRANULOCYTES NFR BLD AUTO: 0.5 % (ref 0–0.5)
LYMPHOCYTES # BLD AUTO: 1 K/UL (ref 1–4.8)
LYMPHOCYTES NFR BLD: 13.5 % (ref 18–48)
MCH RBC QN AUTO: 28.6 PG (ref 27–31)
MCHC RBC AUTO-ENTMCNC: 32.1 G/DL (ref 32–36)
MCV RBC AUTO: 89 FL (ref 82–98)
MONOCYTES # BLD AUTO: 0.5 K/UL (ref 0.3–1)
MONOCYTES NFR BLD: 7.4 % (ref 4–15)
NEUTROPHILS # BLD AUTO: 5.4 K/UL (ref 1.8–7.7)
NEUTROPHILS NFR BLD: 73.2 % (ref 38–73)
NRBC BLD-RTO: 0 /100 WBC
PLATELET # BLD AUTO: 370 K/UL (ref 150–450)
PMV BLD AUTO: 9.1 FL (ref 9.2–12.9)
POTASSIUM SERPL-SCNC: 4.2 MMOL/L (ref 3.5–5.1)
RBC # BLD AUTO: 3.92 M/UL (ref 4–5.4)
SODIUM SERPL-SCNC: 138 MMOL/L (ref 136–145)
WBC # BLD AUTO: 7.32 K/UL (ref 3.9–12.7)

## 2024-02-02 PROCEDURE — 36415 COLL VENOUS BLD VENIPUNCTURE: CPT | Performed by: UROLOGY

## 2024-02-02 PROCEDURE — 85025 COMPLETE CBC W/AUTO DIFF WBC: CPT | Performed by: UROLOGY

## 2024-02-02 PROCEDURE — 80048 BASIC METABOLIC PNL TOTAL CA: CPT | Performed by: UROLOGY

## 2024-02-03 NOTE — PHYSICIAN QUERY
PT Name: Marylou Bender  MR #: [id number]    DOCUMENTATION CLARIFICATION     CDS/: Quita Rouse               Contact information:[phone] or through epic messenger  This form is a permanent document in the medical record.     Query Date: February 3, 2024    By submitting this query, we are merely seeking further clarification of documentation.  Please utilize your independent clinical judgment when addressing the question(s) below.    The medical record contains the following:  Pathology Findings Location in Medical Record     1. KIDNEY, LEFT, RADIAL NEPHRECTOMY  - CLEAR CELL RENAL CELL CARCINOMA  - INCIDENTAL ONCOCYTOMA (1.9 CM)  - ADRENAL GLAND WITH NO SIGNIFICANT HISTOPATHOLOGIC FINDINGS  - SEE TUMOR SYNOPTIC REPORT    2. LYMPH NODE, RETROPERITONEAL, EXCISION  - FOUR BENIGN LYMPH NODES   Pathology [facility] 1/24/24         Please clarify the pathology findings.  [ X] Pathology findings noted above are ruled in/confirmed as diagnoses   [  ] Pathology findings noted above are not confirmed as diagnoses   [  ] Pathology findings noted above are incidental   [  ] Other diagnosis (please specify):        [  ] Clinically Undetermined

## 2024-02-03 NOTE — PHYSICIAN QUERY
PT Name: Marylou Bender  MR #: [id number]    DOCUMENTATION CLARIFICATION      CDS/: Quita Rouse               Contact information:[phone] or through epic messenger    This form is a permanent document in the medical record.      Query Date: February 3, 2024    By submitting this query, we are merely seeking further clarification of documentation. Please utilize your independent clinical judgment when addressing the question(s) below.    The Medical Record contains the following:   Indicators  Supporting Clinical Findings Location in Medical Record    Anemia documented Initial POD 0 mild expected anemia  DC summary    H&H H/H   10.7/33.7 on 1/24 @ 1310  9.8/30.8 on 1/25 @ 0417  9.6/29.8 on 1/25 @ 1028      H/H 13.3/41.6 on 1/9/24 per epic record Labs EPIC    Procedure/Surgery Performed/EBL Left robot-assisted laparoscopic radical nephrectomy  Estimated Blood Loss:     500cc  OR note    Acute/Chronic illness Left Kidney Mass, path came back with RCC, CKD 3      Provider, please specify type of anemia associated with above clinical findings. ARABELLA ALL THAT APPLY.  [   ] Acute blood loss anemia    [ X  ] Acute blood loss anemia expected post-operatively  - as noted   [   ] Iron deficiency anemia    [   ] Chronic blood loss anemia     [   ] Anemia of chronic kidney disease   [   ] Anemia due to neoplastic disease   [   ] Anemia, unspecified    [   ] Other type of anemia (please specify):

## 2024-02-05 ENCOUNTER — OFFICE VISIT (OUTPATIENT)
Dept: UROLOGY | Facility: CLINIC | Age: 69
End: 2024-02-05
Payer: MEDICARE

## 2024-02-05 VITALS — SYSTOLIC BLOOD PRESSURE: 140 MMHG | HEART RATE: 91 BPM | DIASTOLIC BLOOD PRESSURE: 88 MMHG

## 2024-02-05 DIAGNOSIS — N28.89 OTHER SPECIFIED DISORDERS OF KIDNEY AND URETER: ICD-10-CM

## 2024-02-05 DIAGNOSIS — N18.31 STAGE 3A CHRONIC KIDNEY DISEASE: ICD-10-CM

## 2024-02-05 DIAGNOSIS — Z90.5 SOLITARY KIDNEY, ACQUIRED: ICD-10-CM

## 2024-02-05 DIAGNOSIS — C64.2 RENAL CELL CARCINOMA, LEFT: Primary | ICD-10-CM

## 2024-02-05 PROCEDURE — 99999 PR PBB SHADOW E&M-EST. PATIENT-LVL III: CPT | Mod: PBBFAC,,, | Performed by: UROLOGY

## 2024-02-05 PROCEDURE — 99213 OFFICE O/P EST LOW 20 MIN: CPT | Mod: PBBFAC,PO | Performed by: UROLOGY

## 2024-02-05 PROCEDURE — 99024 POSTOP FOLLOW-UP VISIT: CPT | Mod: POP,,, | Performed by: UROLOGY

## 2024-02-05 NOTE — PROGRESS NOTES
[facility] Urology Progress Note    Marylou Bender is a 68 y.o. female who presents for postop f/u s/p L radical nephrectomy    3d isolated GH and workup yielded negative lower tract save for some benign urethral polyps but CTU revealed >6cm lower pole left renal mass, solid enhancing and with irregular appearance concerning for RCC, extending centrally into collecting system and with extent into renal vein, confirmed on MRI without local invasion and with extent into early renal vein at hilum  Carried diagnosis of CKD3 with baseline eGFR 55 preop though no HTN/DM    Left robot-assisted laparoscopic radical nephrectomy on 1/24/24 - complex 2/2 medial fat, adhesions, tumor neovascularity - non adrenal sparing  PATH: pT3aN0/4MxR0 clear cell RCC 8cm (with incidental 2cm oncocytoma) - no necrosis, no rhabdoid/sarcomatoid, no LVI  Did well and DCed POD 1. Renal function with minimal decline from baseline, stabilized. 500cc ebl and dilutional downtrend of H/H stable at DC home   Reference Range  01/24/24 13:10 01/25/24 04:17 01/25/24 10:28 02/02/24 12:21   Hemoglobin 12.0 - 16.0  10.7 (L) 9.8 (L) 9.6 (L) 11.2 (L)   Hematocrit 37.0 - 48.5 % 33.7 (L) 30.8 (L) 29.8 (L) 34.9 (L)      Reference Range 01/09/24 10:31 01/24/24 13:10 01/25/24 04:17 02/02/24 12:21   Creatinine 0.5 - 1.4  1.1 1.3 1.4 1.4   eGFR >60  55 ! 45 ! 41 ! 41 !     She present today noting  Has overall done well  Regular bowel habits off bowel regimen  Though difficult to discern straining etc  Rash started a few d ago - diffuse truncal back front. Wasn't on abx, stopped all otcs and started benadryl, only taking tylenol not near incisions still present  Sensory left thigh decrease, improving  Urinating well  No fever chills sob  Energy still low, slowly improving  Burning tongue sensation she struggled with preop has resolved      Focused Physical Exam:    Vitals:    02/05/24 1258   BP: (!) 140/88   Pulse: 91       Abdomen: Soft, non-tender, nondistended, no CVA  tenderness, midline extraction site no hernia. subQ suture layers palpable with mild firmness  Dermabond still in place  Lap incisions cdi  Slight red maculopapular lesions on chest      Recent Results (from the past 336 hour(s))   Type & Screen    Collection Time: 01/24/24  6:20 AM   Result Value Ref Range    Group & Rh A POS     Indirect Joie NEG     Specimen Outdate 01/27/2024 23:59    Basic metabolic panel    Collection Time: 01/24/24  1:10 PM   Result Value Ref Range    Sodium 139 136 - 145 mmol/L    Potassium 4.2 3.5 - 5.1 mmol/L    Chloride 104 95 - 110 mmol/L    CO2 21 (L) 23 - 29 mmol/L    Glucose 196 (H) 70 - 110 mg/dL    BUN 14 8 - 23 mg/dL    Creatinine 1.3 0.5 - 1.4 mg/dL    Calcium 7.7 (L) 8.7 - 10.5 mg/dL    Anion Gap 14 8 - 16 mmol/L    eGFR 45 (A) >60 mL/min/1.73 m^2   CBC auto differential    Collection Time: 01/24/24  1:10 PM   Result Value Ref Range    WBC 6.86 3.90 - 12.70 K/uL    RBC 3.80 (L) 4.00 - 5.40 M/uL    Hemoglobin 10.7 (L) 12.0 - 16.0 g/dL    Hematocrit 33.7 (L) 37.0 - 48.5 %    MCV 89 82 - 98 fL    MCH 28.2 27.0 - 31.0 pg    MCHC 31.8 (L) 32.0 - 36.0 g/dL    RDW 14.0 11.5 - 14.5 %    Platelets 161 150 - 450 K/uL    MPV 9.0 (L) 9.2 - 12.9 fL    Immature Granulocytes 0.3 0.0 - 0.5 %    Gran # (ANC) 6.3 1.8 - 7.7 K/uL    Immature Grans (Abs) 0.02 0.00 - 0.04 K/uL    Lymph # 0.3 (L) 1.0 - 4.8 K/uL    Mono # 0.3 0.3 - 1.0 K/uL    Eos # 0.0 0.0 - 0.5 K/uL    Baso # 0.01 0.00 - 0.20 K/uL    nRBC 0 0 /100 WBC    Gran % 91.1 (H) 38.0 - 73.0 %    Lymph % 4.4 (L) 18.0 - 48.0 %    Mono % 4.1 4.0 - 15.0 %    Eosinophil % 0.0 0.0 - 8.0 %    Basophil % 0.1 0.0 - 1.9 %    Differential Method Automated    Basic metabolic panel    Collection Time: 01/25/24  4:17 AM   Result Value Ref Range    Sodium 138 136 - 145 mmol/L    Potassium 4.2 3.5 - 5.1 mmol/L    Chloride 103 95 - 110 mmol/L    CO2 26 23 - 29 mmol/L    Glucose 115 (H) 70 - 110 mg/dL    BUN 13 8 - 23 mg/dL    Creatinine 1.4 0.5 - 1.4 mg/dL     Calcium 7.9 (L) 8.7 - 10.5 mg/dL    Anion Gap 9 8 - 16 mmol/L    eGFR 41 (A) >60 mL/min/1.73 m^2   Magnesium    Collection Time: 01/25/24  4:17 AM   Result Value Ref Range    Magnesium 1.9 1.6 - 2.6 mg/dL   Phosphorus    Collection Time: 01/25/24  4:17 AM   Result Value Ref Range    Phosphorus 3.0 2.7 - 4.5 mg/dL   CBC auto differential    Collection Time: 01/25/24  4:17 AM   Result Value Ref Range    WBC 5.44 3.90 - 12.70 K/uL    RBC 3.47 (L) 4.00 - 5.40 M/uL    Hemoglobin 9.8 (L) 12.0 - 16.0 g/dL    Hematocrit 30.8 (L) 37.0 - 48.5 %    MCV 89 82 - 98 fL    MCH 28.2 27.0 - 31.0 pg    MCHC 31.8 (L) 32.0 - 36.0 g/dL    RDW 14.1 11.5 - 14.5 %    Platelets 177 150 - 450 K/uL    MPV 9.8 9.2 - 12.9 fL    Immature Granulocytes 0.2 0.0 - 0.5 %    Gran # (ANC) 4.0 1.8 - 7.7 K/uL    Immature Grans (Abs) 0.01 0.00 - 0.04 K/uL    Lymph # 0.8 (L) 1.0 - 4.8 K/uL    Mono # 0.6 0.3 - 1.0 K/uL    Eos # 0.0 0.0 - 0.5 K/uL    Baso # 0.01 0.00 - 0.20 K/uL    nRBC 0 0 /100 WBC    Gran % 74.0 (H) 38.0 - 73.0 %    Lymph % 14.9 (L) 18.0 - 48.0 %    Mono % 10.5 4.0 - 15.0 %    Eosinophil % 0.2 0.0 - 8.0 %    Basophil % 0.2 0.0 - 1.9 %    Differential Method Automated    Hemoglobin    Collection Time: 01/25/24 10:28 AM   Result Value Ref Range    Hemoglobin 9.6 (L) 12.0 - 16.0 g/dL   Hematocrit    Collection Time: 01/25/24 10:28 AM   Result Value Ref Range    Hematocrit 29.8 (L) 37.0 - 48.5 %   Basic Metabolic Panel    Collection Time: 02/02/24 12:21 PM   Result Value Ref Range    Sodium 138 136 - 145 mmol/L    Potassium 4.2 3.5 - 5.1 mmol/L    Chloride 103 95 - 110 mmol/L    CO2 27 23 - 29 mmol/L    Glucose 103 70 - 110 mg/dL    BUN 19 8 - 23 mg/dL    Creatinine 1.4 0.5 - 1.4 mg/dL    Calcium 8.9 8.7 - 10.5 mg/dL    Anion Gap 8 8 - 16 mmol/L    eGFR 41 (A) >60 mL/min/1.73 m^2   CBC Auto Differential    Collection Time: 02/02/24 12:21 PM   Result Value Ref Range    WBC 7.32 3.90 - 12.70 K/uL    RBC 3.92 (L) 4.00 - 5.40 M/uL    Hemoglobin  11.2 (L) 12.0 - 16.0 g/dL    Hematocrit 34.9 (L) 37.0 - 48.5 %    MCV 89 82 - 98 fL    MCH 28.6 27.0 - 31.0 pg    MCHC 32.1 32.0 - 36.0 g/dL    RDW 14.4 11.5 - 14.5 %    Platelets 370 150 - 450 K/uL    MPV 9.1 (L) 9.2 - 12.9 fL    Immature Granulocytes 0.5 0.0 - 0.5 %    Gran # (ANC) 5.4 1.8 - 7.7 K/uL    Immature Grans (Abs) 0.04 0.00 - 0.04 K/uL    Lymph # 1.0 1.0 - 4.8 K/uL    Mono # 0.5 0.3 - 1.0 K/uL    Eos # 0.4 0.0 - 0.5 K/uL    Baso # 0.02 0.00 - 0.20 K/uL    nRBC 0 0 /100 WBC    Gran % 73.2 (H) 38.0 - 73.0 %    Lymph % 13.5 (L) 18.0 - 48.0 %    Mono % 7.4 4.0 - 15.0 %    Eosinophil % 5.1 0.0 - 8.0 %    Basophil % 0.3 0.0 - 1.9 %    Differential Method Automated        ASSESSMENT   1. Renal cell carcinoma, left  Basic Metabolic Panel    CT Chest Without Contrast      2. Solitary kidney, acquired        3. Stage 3a chronic kidney disease  Basic Metabolic Panel      4. Other specified disorders of kidney and ureter  MRI Abdomen W WO Contrast          Plan    Overall doing well 2 weeks status post left radical nephrectomy.  Reviewed pathology noting clear cell renal cell, T3 with extent into central renal sinus and early branches of renal vein consistent with preop imaging, resected with negative margins.  No other extent outside the kidney.  No poor prognostic features such as rhabdoid sarcomatoid, no necrosis, no lymphovascular invasion.  Negative lymph nodes.  Noted the incidental oncocytoma in the kidney is a benign mass, however as the dominant tumor was a clear cell renal cell carcinoma, now completely resected, no adjuvant treatment is needed.  We discussed surveillance.  We also reviewed that clear cell renal cell is most common subtype, and the last likely to be bilateral or recur in the contralateral kidney.  We reviewed surveillance for T3 RCC as Q six-month BMP with CT of the chest and abdomen with contrast for 3 years, then annually.  However, given her CKD, could also do CT non con of the chest  and MRI of the abdomen with contrast as gadolinium is less nephrotoxic then iodinated contrast media.  Certainly if her renal function recovers back to her baseline, could change imaging so will get labs 2 weeks prior to planned imaging.  She did have baseline CKD 3 in the absence of hypertension and diabetes preop, with a baseline GFR of 55, so given agent solitary kidney, her GFR decline is not unexpected, and stable from the hospital.  As she does not have any other significant risk factors, did not feel distinct need for nephrology referral at this time, as there also may be some hyper filtration still present from her newly solitary kidney.  Renal function is stable from time of discharge 2 weeks later, and she will follow closely with primary care.  Advise sodium restriction less than 2000 mg a day, adequate hydration, and avoiding NSAIDs, which he does as she is allergic.  H/H improved.  Will plan six-month surveillance imaging and then follow-up at that time.  If doing well, can then continue Q six-month surveillance imaging with annual visits.  We did review that lifting restrictions are remain in place until 4 weeks postop.  As well, given her persistence of rash and itching, okay to use Benadryl PRN, and offered dermatologic evaluation but declined as has seen dermatology before.  Will follow-up with PCP.  Has discontinued all offending agents.  We did review that in rare cases there can be reaction to Dermabond but this is usually localized around the incisions, as this is diffuse, still recommended removal of all residual Dermabond asap.  Otherwise, her mild sensory deficit in her left thigh is likely from positioning, as positioning was prolonged to appropriately pad and position given body habitus, and no strength is affected ambulation not affected, just sensory and will continue to improve.

## 2024-02-27 DIAGNOSIS — Z00.00 ENCOUNTER FOR MEDICARE ANNUAL WELLNESS EXAM: ICD-10-CM

## 2024-03-04 ENCOUNTER — TELEPHONE (OUTPATIENT)
Dept: FAMILY MEDICINE | Facility: CLINIC | Age: 69
End: 2024-03-04
Payer: MEDICARE

## 2024-03-04 ENCOUNTER — PATIENT MESSAGE (OUTPATIENT)
Dept: FAMILY MEDICINE | Facility: CLINIC | Age: 69
End: 2024-03-04

## 2024-03-04 ENCOUNTER — OFFICE VISIT (OUTPATIENT)
Dept: FAMILY MEDICINE | Facility: CLINIC | Age: 69
End: 2024-03-04
Payer: MEDICARE

## 2024-03-04 VITALS
RESPIRATION RATE: 18 BRPM | HEART RATE: 71 BPM | DIASTOLIC BLOOD PRESSURE: 84 MMHG | TEMPERATURE: 99 F | HEIGHT: 67 IN | SYSTOLIC BLOOD PRESSURE: 136 MMHG | BODY MASS INDEX: 37.59 KG/M2 | OXYGEN SATURATION: 99 %

## 2024-03-04 DIAGNOSIS — E53.8 VITAMIN B12 DEFICIENCY: ICD-10-CM

## 2024-03-04 DIAGNOSIS — Z13.1 SCREENING FOR DIABETES MELLITUS: ICD-10-CM

## 2024-03-04 DIAGNOSIS — E66.01 SEVERE OBESITY (BMI 35.0-39.9) WITH COMORBIDITY: ICD-10-CM

## 2024-03-04 DIAGNOSIS — F32.5 MAJOR DEPRESSIVE DISORDER WITH SINGLE EPISODE, IN REMISSION: ICD-10-CM

## 2024-03-04 DIAGNOSIS — E78.5 HYPERLIPIDEMIA, UNSPECIFIED HYPERLIPIDEMIA TYPE: Primary | ICD-10-CM

## 2024-03-04 DIAGNOSIS — Z90.5 STATUS POST NEPHRECTOMY: ICD-10-CM

## 2024-03-04 DIAGNOSIS — R79.9 ABNORMAL FINDING OF BLOOD CHEMISTRY, UNSPECIFIED: ICD-10-CM

## 2024-03-04 DIAGNOSIS — N18.32 STAGE 3B CHRONIC KIDNEY DISEASE: ICD-10-CM

## 2024-03-04 DIAGNOSIS — G47.00 INSOMNIA, UNSPECIFIED TYPE: ICD-10-CM

## 2024-03-04 DIAGNOSIS — R20.2 PARESTHESIA OF LEFT LOWER EXTREMITY: ICD-10-CM

## 2024-03-04 DIAGNOSIS — F41.9 ANXIETY: ICD-10-CM

## 2024-03-04 PROCEDURE — 99214 OFFICE O/P EST MOD 30 MIN: CPT | Mod: S$PBB,,, | Performed by: FAMILY MEDICINE

## 2024-03-04 PROCEDURE — 99213 OFFICE O/P EST LOW 20 MIN: CPT | Mod: PBBFAC,PN | Performed by: FAMILY MEDICINE

## 2024-03-04 PROCEDURE — 99999 PR PBB SHADOW E&M-EST. PATIENT-LVL III: CPT | Mod: PBBFAC,,, | Performed by: FAMILY MEDICINE

## 2024-03-04 RX ORDER — ALPRAZOLAM 0.5 MG/1
0.5 TABLET ORAL 3 TIMES DAILY PRN
Qty: 90 TABLET | Refills: 2 | Status: SHIPPED | OUTPATIENT
Start: 2024-03-04 | End: 2024-05-14 | Stop reason: SDUPTHER

## 2024-03-04 NOTE — TELEPHONE ENCOUNTER
----- Message from Jolie Jennings sent at 3/4/2024 10:57 AM CST -----  Type: Needs Medical Advice  Who Called:  patient  Best Call Back Number: [phone] (home)   Additional Information: patient received a wait list offer for today so she accepted the appt but she thought dr soria wasn't in today, please advise

## 2024-03-04 NOTE — PROGRESS NOTES
Hyperlipidemia not on her statin.  Due for check.  Status post left nephrectomy 5 weeks ago.  Had gross hematuria found 8.2 cm left renal cancer on CT.  CKD 3B.  GFR 41.  Hemoglobin 11 postop.  Paresthesias left thigh.  Probably due to positioning with surgery.  Anxiety Zoloft 150 mg.  Depression doing fairly well.  Xanax 0.5 b.i.d. increase use due to the stress.  Has 1 pill left.  Insomnia without Xanax.  Also has B12 deficiency.      Physical examination.  Vital signs noted.  Blood pressure controlled.  Neck without bruit.  Chest clear.  Heart regular rate rhythm.  Abdomen bowel sounds are positive soft nontender.  Extremities without edema.  Subjective:       Patient ID: Marylou Bender is a 68 y.o. female.    Chief Complaint: Follow-up (After kidney removed)    HPI    Objective:        Assessment:       1. Hyperlipidemia, unspecified hyperlipidemia type    2. Vitamin B12 deficiency    3. Insomnia, unspecified type    4. Anxiety    5. Paresthesia of left lower extremity    6. Stage 3b chronic kidney disease    7. Status post nephrectomy    8. Screening for diabetes mellitus    9. Abnormal finding of blood chemistry, unspecified    10. Severe obesity (BMI 35.0-39.9) with comorbidity    11. Major depressive disorder with single episode, in remission        Plan:       Hyperlipidemia, unspecified hyperlipidemia type  -     Lipid Panel; Future; Expected date: 03/04/2024  -     CBC Auto Differential; Future; Expected date: 03/04/2024    Vitamin B12 deficiency    Insomnia, unspecified type    Anxiety    Paresthesia of left lower extremity  -     TSH; Future; Expected date: 03/04/2024    Stage 3b chronic kidney disease  -     Comprehensive Metabolic Panel; Future; Expected date: 03/04/2024    Status post nephrectomy    Screening for diabetes mellitus  -     Hemoglobin A1C; Future; Expected date: 03/04/2024    Abnormal finding of blood chemistry, unspecified  -     Hemoglobin A1C; Future; Expected date: 03/04/2024    Severe  obesity (BMI 35.0-39.9) with comorbidity    Major depressive disorder with single episode, in remission    Other orders  -     ALPRAZolam (XANAX) 0.5 MG tablet; Take 1 tablet (0.5 mg total) by mouth 3 (three) times daily as needed for Anxiety.  Dispense: 90 tablet; Refill: 2        DEXA scan was done in [facility].  Need to get the result.  Mammogram ordered.  Xanax 0.5 t.i.d. maximum 90 with 2 refills.  Vitamin-D 2000 per day.  Sublingual B12 5000 per day.  Lipids A1c CMP TSH CBC ordered.

## 2024-03-08 DIAGNOSIS — Z12.31 SCREENING MAMMOGRAM FOR HIGH-RISK PATIENT: Primary | ICD-10-CM

## 2024-03-19 ENCOUNTER — PATIENT MESSAGE (OUTPATIENT)
Dept: UROLOGY | Facility: CLINIC | Age: 69
End: 2024-03-19
Payer: MEDICARE

## 2024-03-26 ENCOUNTER — LAB VISIT (OUTPATIENT)
Dept: LAB | Facility: HOSPITAL | Age: 69
End: 2024-03-26
Attending: FAMILY MEDICINE
Payer: MEDICARE

## 2024-03-26 ENCOUNTER — PATIENT MESSAGE (OUTPATIENT)
Dept: FAMILY MEDICINE | Facility: CLINIC | Age: 69
End: 2024-03-26
Payer: MEDICARE

## 2024-03-26 DIAGNOSIS — Z13.1 SCREENING FOR DIABETES MELLITUS: ICD-10-CM

## 2024-03-26 DIAGNOSIS — R20.2 PARESTHESIA OF LEFT LOWER EXTREMITY: ICD-10-CM

## 2024-03-26 DIAGNOSIS — E78.5 HYPERLIPIDEMIA, UNSPECIFIED HYPERLIPIDEMIA TYPE: ICD-10-CM

## 2024-03-26 DIAGNOSIS — R79.9 ABNORMAL FINDING OF BLOOD CHEMISTRY, UNSPECIFIED: ICD-10-CM

## 2024-03-26 DIAGNOSIS — N18.32 STAGE 3B CHRONIC KIDNEY DISEASE: ICD-10-CM

## 2024-03-26 LAB
ALBUMIN SERPL BCP-MCNC: 3.6 G/DL (ref 3.5–5.2)
ALP SERPL-CCNC: 98 U/L (ref 55–135)
ALT SERPL W/O P-5'-P-CCNC: 13 U/L (ref 10–44)
ANION GAP SERPL CALC-SCNC: 9 MMOL/L (ref 8–16)
AST SERPL-CCNC: 17 U/L (ref 10–40)
BASOPHILS # BLD AUTO: 0.04 K/UL (ref 0–0.2)
BASOPHILS NFR BLD: 0.8 % (ref 0–1.9)
BILIRUB SERPL-MCNC: 0.5 MG/DL (ref 0.1–1)
BUN SERPL-MCNC: 24 MG/DL (ref 8–23)
CALCIUM SERPL-MCNC: 9.5 MG/DL (ref 8.7–10.5)
CHLORIDE SERPL-SCNC: 104 MMOL/L (ref 95–110)
CHOLEST SERPL-MCNC: 273 MG/DL (ref 120–199)
CHOLEST/HDLC SERPL: 4.4 {RATIO} (ref 2–5)
CO2 SERPL-SCNC: 23 MMOL/L (ref 23–29)
CREAT SERPL-MCNC: 1.4 MG/DL (ref 0.5–1.4)
DIFFERENTIAL METHOD BLD: ABNORMAL
EOSINOPHIL # BLD AUTO: 0.2 K/UL (ref 0–0.5)
EOSINOPHIL NFR BLD: 3.1 % (ref 0–8)
ERYTHROCYTE [DISTWIDTH] IN BLOOD BY AUTOMATED COUNT: 14.2 % (ref 11.5–14.5)
EST. GFR  (NO RACE VARIABLE): 40.7 ML/MIN/1.73 M^2
ESTIMATED AVG GLUCOSE: 105 MG/DL (ref 68–131)
GLUCOSE SERPL-MCNC: 88 MG/DL (ref 70–110)
HBA1C MFR BLD: 5.3 % (ref 4–5.6)
HCT VFR BLD AUTO: 40.2 % (ref 37–48.5)
HDLC SERPL-MCNC: 62 MG/DL (ref 40–75)
HDLC SERPL: 22.7 % (ref 20–50)
HGB BLD-MCNC: 12.6 G/DL (ref 12–16)
IMM GRANULOCYTES # BLD AUTO: 0.01 K/UL (ref 0–0.04)
IMM GRANULOCYTES NFR BLD AUTO: 0.2 % (ref 0–0.5)
LDLC SERPL CALC-MCNC: 174.8 MG/DL (ref 63–159)
LYMPHOCYTES # BLD AUTO: 1.3 K/UL (ref 1–4.8)
LYMPHOCYTES NFR BLD: 24.9 % (ref 18–48)
MCH RBC QN AUTO: 27.6 PG (ref 27–31)
MCHC RBC AUTO-ENTMCNC: 31.3 G/DL (ref 32–36)
MCV RBC AUTO: 88 FL (ref 82–98)
MONOCYTES # BLD AUTO: 0.5 K/UL (ref 0.3–1)
MONOCYTES NFR BLD: 9.2 % (ref 4–15)
NEUTROPHILS # BLD AUTO: 3.2 K/UL (ref 1.8–7.7)
NEUTROPHILS NFR BLD: 61.8 % (ref 38–73)
NONHDLC SERPL-MCNC: 211 MG/DL
NRBC BLD-RTO: 0 /100 WBC
PLATELET # BLD AUTO: 250 K/UL (ref 150–450)
PMV BLD AUTO: 10 FL (ref 9.2–12.9)
POTASSIUM SERPL-SCNC: 4.2 MMOL/L (ref 3.5–5.1)
PROT SERPL-MCNC: 7.4 G/DL (ref 6–8.4)
RBC # BLD AUTO: 4.56 M/UL (ref 4–5.4)
SODIUM SERPL-SCNC: 136 MMOL/L (ref 136–145)
TRIGL SERPL-MCNC: 181 MG/DL (ref 30–150)
TSH SERPL DL<=0.005 MIU/L-ACNC: 1.92 UIU/ML (ref 0.4–4)
WBC # BLD AUTO: 5.19 K/UL (ref 3.9–12.7)

## 2024-03-26 PROCEDURE — 84443 ASSAY THYROID STIM HORMONE: CPT | Performed by: FAMILY MEDICINE

## 2024-03-26 PROCEDURE — 85025 COMPLETE CBC W/AUTO DIFF WBC: CPT | Performed by: FAMILY MEDICINE

## 2024-03-26 PROCEDURE — 36415 COLL VENOUS BLD VENIPUNCTURE: CPT | Mod: PO | Performed by: FAMILY MEDICINE

## 2024-03-26 PROCEDURE — 83036 HEMOGLOBIN GLYCOSYLATED A1C: CPT | Performed by: FAMILY MEDICINE

## 2024-03-26 PROCEDURE — 80061 LIPID PANEL: CPT | Performed by: FAMILY MEDICINE

## 2024-03-26 PROCEDURE — 80053 COMPREHEN METABOLIC PANEL: CPT | Performed by: FAMILY MEDICINE

## 2024-03-27 RX ORDER — ROSUVASTATIN CALCIUM 20 MG/1
20 TABLET, COATED ORAL DAILY
Qty: 90 TABLET | Refills: 3 | OUTPATIENT
Start: 2024-03-27 | End: 2025-03-27

## 2024-03-28 RX ORDER — ROSUVASTATIN CALCIUM 20 MG/1
20 TABLET, COATED ORAL DAILY
Qty: 90 TABLET | Refills: 1 | Status: SHIPPED | OUTPATIENT
Start: 2024-03-28 | End: 2024-04-12

## 2024-04-01 ENCOUNTER — HOSPITAL ENCOUNTER (OUTPATIENT)
Dept: RADIOLOGY | Facility: HOSPITAL | Age: 69
Discharge: HOME | End: 2024-04-01
Attending: FAMILY MEDICINE
Payer: MEDICARE

## 2024-04-01 DIAGNOSIS — E78.5 HYPERLIPIDEMIA, UNSPECIFIED HYPERLIPIDEMIA TYPE: Primary | ICD-10-CM

## 2024-04-01 DIAGNOSIS — Z12.31 SCREENING MAMMOGRAM FOR HIGH-RISK PATIENT: ICD-10-CM

## 2024-04-01 PROCEDURE — 77067 SCR MAMMO BI INCL CAD: CPT | Mod: TC,PO

## 2024-04-01 PROCEDURE — 77063 BREAST TOMOSYNTHESIS BI: CPT | Mod: TC,PO

## 2024-04-01 RX ORDER — ATORVASTATIN CALCIUM 20 MG/1
20 TABLET, FILM COATED ORAL DAILY
Qty: 90 TABLET | Refills: 0 | Status: SHIPPED | OUTPATIENT
Start: 2024-04-01 | End: 2025-04-01

## 2024-04-12 ENCOUNTER — ON-DEMAND VIRTUAL (OUTPATIENT)
Dept: URGENT CARE | Facility: CLINIC | Age: 69
End: 2024-04-12
Payer: MEDICARE

## 2024-04-12 DIAGNOSIS — J32.9 SINUSITIS, UNSPECIFIED CHRONICITY, UNSPECIFIED LOCATION: Primary | ICD-10-CM

## 2024-04-12 PROCEDURE — 99213 OFFICE O/P EST LOW 20 MIN: CPT | Mod: 95,,, | Performed by: NURSE PRACTITIONER

## 2024-04-12 RX ORDER — AMOXICILLIN AND CLAVULANATE POTASSIUM 875; 125 MG/1; MG/1
1 TABLET, FILM COATED ORAL EVERY 12 HOURS
Qty: 14 TABLET | Refills: 0 | Status: SHIPPED | OUTPATIENT
Start: 2024-04-12 | End: 2024-04-19

## 2024-04-12 NOTE — PROGRESS NOTES
Subjective:      Patient ID: Marylou Bender is a 69 y.o. female.    Vitals:  vitals were not taken for this visit.     Chief Complaint: URI      Visit Type: TELE AUDIOVISUAL    Present with the patient at the time of consultation: TELEMED PRESENT WITH PATIENT: None, at home    Past Medical History:   Diagnosis Date    CKD (chronic kidney disease) stage 3, GFR 30-59 ml/min     Diverticulitis     Fluttering heart     PTSD (post-traumatic stress disorder)     Stress incontinence (female)      Past Surgical History:   Procedure Laterality Date    APPENDECTOMY      BUNIONECTOMY Left      SECTION      COLONOSCOPY W/ POLYPECTOMY  2023    COLON REPORT   5YR RECALL    [facility]    CYSTOSCOPY N/A 2023    Procedure: CYSTOSCOPY;  Surgeon: Wintson Chand MD;  Location: [facility] OR;  Service: Urology;  Laterality: N/A;    HYSTERECTOMY      ROBOT-ASSISTED LAPAROSCOPIC NEPHRECTOMY Left 2024    Procedure: ROBOTIC NEPHRECTOMY;  Surgeon: Winston Chand MD;  Location: [facility] OR;  Service: Urology;  Laterality: Left;    TONSILLECTOMY       Review of patient's allergies indicates:   Allergen Reactions    Naproxen Other (See Comments)     Gastric bleed    Nsaids (non-steroidal anti-inflammatory drug)      Current Outpatient Medications on File Prior to Visit   Medication Sig Dispense Refill    acetaminophen (TYLENOL) 325 MG tablet Take 2 tablets (650 mg total) by mouth every 6 (six) hours as needed for Pain.  0    ALPRAZolam (XANAX) 0.5 MG tablet Take 1 tablet (0.5 mg total) by mouth 3 (three) times daily as needed for Anxiety. 90 tablet 2    atorvastatin (LIPITOR) 20 MG tablet Take 1 tablet (20 mg total) by mouth once daily. 90 tablet 0    docusate sodium (COLACE) 100 MG capsule Take 1 capsule (100 mg total) by mouth 2 (two) times daily.  0    sertraline (ZOLOFT) 100 MG tablet TAKE 1 & 1/2 (ONE & ONE-HALF) TABLETS BY MOUTH ONCE DAILY 90 tablet 2    [DISCONTINUED] rosuvastatin (CRESTOR) 20 MG  tablet Take 1 tablet (20 mg total) by mouth once daily. 90 tablet 1     No current facility-administered medications on file prior to visit.     Family History   Problem Relation Age of Onset    Cancer Mother         colon    Thyroid disease Mother     Kidney disease Mother     Heart disease Mother     Stroke Mother     Diabetes Mother     Heart disease Father     Thyroid disease Sister     Cancer Maternal Grandmother 62        Colon       Medications Ordered                [id number] [place] [id number]   [id number] [address] [id number]    Telephone: [phone]   Fax: [phone]   Hours: Not open 24 hours                         E-Prescribed (1 of 1)              amoxicillin-clavulanate 875-125mg (AUGMENTIN) 875-125 mg per tablet    Sig: Take 1 tablet by mouth every 12 (twelve) hours. for 7 days       Start: 4/12/24     Quantity: 14 tablet Refills: 0                           Ohs Peq Odvv Intake    4/12/2024  9:30 AM CDT - Filed by Patient   What is your current physical address in the event of a medical emergency? [address] [place]   Are you able to take your vital signs? No   Please attach any relevant images or files          URI symptoms for 2 weeks, worsening symptoms. +sick contacts at home all requiring antibiotic treatment. Taking Robitussin with little relief.    URI   Associated symptoms include coughing, headaches, sinus pain and a sore throat (from cough). Pertinent negatives include no congestion, ear pain or wheezing.       Constitution: Negative for chills and fever.   HENT:  Positive for postnasal drip, sinus pain, sinus pressure and sore throat (from cough). Negative for ear pain, congestion, trouble swallowing and voice change.    Respiratory:  Positive for cough. Negative for sputum production, shortness of breath and wheezing.    Neurological:  Positive for headaches.        Objective:   The physical exam was conducted virtually.  Physical Exam    Constitutional: She is oriented to person, place, and time. She does not appear ill. No distress.   HENT:   Head: Normocephalic and atraumatic.   Nose: Right sinus exhibits frontal sinus tenderness. Left sinus exhibits frontal sinus tenderness.   Eyes: Extraocular movement intact   Pulmonary/Chest: Effort normal.   Abdominal: Normal appearance.   Musculoskeletal: Normal range of motion.         General: Normal range of motion.   Neurological: no focal deficit. She is alert and oriented to person, place, and time.   Psychiatric: Her behavior is normal. Mood normal.   Vitals reviewed.      Assessment:     1. Sinusitis, unspecified chronicity, unspecified location        Plan:   Patient encouraged to monitor symptoms closely and instructed to follow-up for new or worsening symptoms. Further, in-person, evaluation may be necessary for continued treatment. Please follow up with your primary care doctor or specialist as needed. Verbally discussed plan. Patient confirms understanding and is in agreement with treatment and plan.     You must understand that you've received a Virtual Care evaluation only and that you may be released before all your medical problems are known or treated. You, the patient, will arrange for follow up care as instructed.      Sinusitis, unspecified chronicity, unspecified location  -     amoxicillin-clavulanate 875-125mg (AUGMENTIN) 875-125 mg per tablet; Take 1 tablet by mouth every 12 (twelve) hours. for 7 days  Dispense: 14 tablet; Refill: 0             Patient Instructions   OVER THE COUNTER RECOMMENDATIONS/SUGGESTIONS (IF NO CONTRAINDICATIONS).     ·         Make sure to stay well hydrated.     ·         Use Nasal Saline to mechanically move any post nasal drip from your eustachian tube or from the back of your throat.     ·         Use warm saltwater gargles to ease your throat pain. Warm saltwater gargles as needed for sore throat-  1/2 tsp salt to 1 cup warm water, gargle as desired.  Warm fluids tend to relieve a sore throat.     .         Throat lozenges, Chloraseptic spray or other over the counter treatments are ok to use as well. Use as directed.     ·         Use an antihistamine such as Claritin, Zyrtec or Allegra to dry you out.     ·         Use Mucinex (guaifenesin) to break up mucous up to 2400mg/day to loosen any mucous.     ·         Use Flonase 1-2 sprays/nostril per day. It is a local acting steroid nasal spray, if you develop a bloody nose, stop using the medication immediately.     ·         Honey is a natural cough suppressant that can be used.     ·         Tylenol up to 4,000 mg a day is safe for short periods and can be used for body aches, pain, and fever. However, in high doses and prolonged use it can cause liver irritation.

## 2024-04-12 NOTE — PATIENT INSTRUCTIONS
OVER THE COUNTER RECOMMENDATIONS/SUGGESTIONS (IF NO CONTRAINDICATIONS).     ·         Make sure to stay well hydrated.     ·         Use Nasal Saline to mechanically move any post nasal drip from your eustachian tube or from the back of your throat.     ·         Use warm saltwater gargles to ease your throat pain. Warm saltwater gargles as needed for sore throat-  1/2 tsp salt to 1 cup warm water, gargle as desired. Warm fluids tend to relieve a sore throat.     .         Throat lozenges, Chloraseptic spray or other over the counter treatments are ok to use as well. Use as directed.     ·         Use an antihistamine such as Claritin, Zyrtec or Allegra to dry you out.     ·         Use Mucinex (guaifenesin) to break up mucous up to 2400mg/day to loosen any mucous.     ·         Use Flonase 1-2 sprays/nostril per day. It is a local acting steroid nasal spray, if you develop a bloody nose, stop using the medication immediately.     ·         Honey is a natural cough suppressant that can be used.     ·         Tylenol up to 4,000 mg a day is safe for short periods and can be used for body aches, pain, and fever. However, in high doses and prolonged use it can cause liver irritation.

## 2024-04-18 ENCOUNTER — TELEPHONE (OUTPATIENT)
Dept: DERMATOLOGY | Facility: CLINIC | Age: 69
End: 2024-04-18
Payer: MEDICARE

## 2024-04-18 NOTE — TELEPHONE ENCOUNTER
----- Message from Anita Robins sent at 4/18/2024 10:23 AM CDT -----  Contact: self  Type:  Sooner Appointment Request  Name of Caller: Pt  When is the first available appointment? ASAP  Symptoms:  COSMETIC LASER, SWEC DERM // blood vessels around nose/cheek-  Best Call Back Number: [phone]  Please call to advise/schedule... Thank you...

## 2024-05-14 ENCOUNTER — OFFICE VISIT (OUTPATIENT)
Dept: FAMILY MEDICINE | Facility: CLINIC | Age: 69
End: 2024-05-14
Payer: MEDICARE

## 2024-05-14 VITALS — HEART RATE: 68 BPM | HEIGHT: 67 IN | TEMPERATURE: 99 F | BODY MASS INDEX: 37.59 KG/M2 | OXYGEN SATURATION: 99 %

## 2024-05-14 DIAGNOSIS — F41.9 ANXIETY: ICD-10-CM

## 2024-05-14 DIAGNOSIS — C64.2 CANCER OF LEFT KIDNEY: ICD-10-CM

## 2024-05-14 DIAGNOSIS — K14.6 BURNING TONGUE: ICD-10-CM

## 2024-05-14 DIAGNOSIS — L71.9 ROSACEA: ICD-10-CM

## 2024-05-14 DIAGNOSIS — F32.5 MAJOR DEPRESSIVE DISORDER WITH SINGLE EPISODE, IN REMISSION: ICD-10-CM

## 2024-05-14 DIAGNOSIS — Z90.5 STATUS POST NEPHRECTOMY: ICD-10-CM

## 2024-05-14 DIAGNOSIS — E78.5 HYPERLIPIDEMIA, UNSPECIFIED HYPERLIPIDEMIA TYPE: Primary | ICD-10-CM

## 2024-05-14 PROCEDURE — 99999 PR PBB SHADOW E&M-EST. PATIENT-LVL III: CPT | Mod: PBBFAC,,, | Performed by: FAMILY MEDICINE

## 2024-05-14 PROCEDURE — G2211 COMPLEX E/M VISIT ADD ON: HCPCS | Mod: S$PBB,,, | Performed by: FAMILY MEDICINE

## 2024-05-14 PROCEDURE — 99214 OFFICE O/P EST MOD 30 MIN: CPT | Mod: S$PBB,,, | Performed by: FAMILY MEDICINE

## 2024-05-14 PROCEDURE — 99213 OFFICE O/P EST LOW 20 MIN: CPT | Mod: PBBFAC,PN | Performed by: FAMILY MEDICINE

## 2024-05-14 RX ORDER — CHOLECALCIFEROL (VITAMIN D3) 50 MCG
TABLET ORAL DAILY
COMMUNITY

## 2024-05-14 RX ORDER — ACETAMINOPHEN 160 MG/5ML
200 SUSPENSION, ORAL (FINAL DOSE FORM) ORAL DAILY
COMMUNITY

## 2024-05-14 RX ORDER — TRAZODONE HYDROCHLORIDE 50 MG/1
50 TABLET ORAL NIGHTLY
COMMUNITY
End: 2024-05-14 | Stop reason: SDUPTHER

## 2024-05-14 RX ORDER — DOXYCYCLINE 100 MG/1
100 CAPSULE ORAL DAILY
Qty: 20 CAPSULE | Refills: 0 | Status: SHIPPED | OUTPATIENT
Start: 2024-05-14

## 2024-05-14 RX ORDER — TRAZODONE HYDROCHLORIDE 50 MG/1
50 TABLET ORAL NIGHTLY
Qty: 90 TABLET | Refills: 1 | Status: SHIPPED | OUTPATIENT
Start: 2024-05-14

## 2024-05-14 RX ORDER — SERTRALINE HYDROCHLORIDE 100 MG/1
TABLET, FILM COATED ORAL
Qty: 135 TABLET | Refills: 1 | Status: SHIPPED | OUTPATIENT
Start: 2024-05-14

## 2024-05-14 RX ORDER — ALPRAZOLAM 0.5 MG/1
0.5 TABLET ORAL 3 TIMES DAILY PRN
Qty: 90 TABLET | Refills: 2 | Status: SHIPPED | OUTPATIENT
Start: 2024-05-14

## 2024-05-14 RX ORDER — DOXYCYCLINE 50 MG/1
50 TABLET ORAL DAILY
COMMUNITY
End: 2024-05-14

## 2024-05-14 RX ORDER — ACETAMINOPHEN, DIPHENHYDRAMINE HCL, PHENYLEPHRINE HCL 325; 25; 5 MG/1; MG/1; MG/1
TABLET ORAL
COMMUNITY

## 2024-05-17 PROBLEM — Z90.5 STATUS POST NEPHRECTOMY: Status: ACTIVE | Noted: 2024-05-17

## 2024-05-17 PROBLEM — C64.2 CANCER OF LEFT KIDNEY: Status: ACTIVE | Noted: 2024-05-17

## 2024-05-18 NOTE — PROGRESS NOTES
Subjective:       Patient ID: Marylou Bender is a 69 y.o. female.    Chief Complaint: No chief complaint on file.    Going to [place] until October.  Depression and anxiety needs refill of Xanax 1-3 per day depending on stress level.  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  Hyperlipidemia on her medication.  Check is due.  Rosacea also.  Status post left nephrectomy for kidney cancer.  Also needs refill of Zoloft.  Needs Vibramycin for the rosacea.      Physical examination.  Vital signs noted.  No acute distress.  Neck without bruit.  Chest clear.  Heart regular rate and rhythm.  Abdomen bowel sounds are positive soft nontender.  Incision slightly tender.  Extremities without edema positive pedal pulses        Objective:        Assessment:       1. Hyperlipidemia, unspecified hyperlipidemia type    2. Anxiety    3. Major depressive disorder with single episode, in remission    4. Rosacea    5. Status post nephrectomy    6. Cancer of left kidney    7. Burning tongue        Plan:       Hyperlipidemia, unspecified hyperlipidemia type  -     Lipid Panel; Future; Expected date: 05/14/2024  -     Comprehensive Metabolic Panel; Future; Expected date: 05/14/2024    Anxiety    Major depressive disorder with single episode, in remission    Rosacea    Status post nephrectomy    Cancer of left kidney    Burning tongue    Other orders  -     sertraline (ZOLOFT) 100 MG tablet; TAKE 1 & 1/2 (ONE & ONE-HALF) TABLETS BY MOUTH ONCE DAILY  Dispense: 135 tablet; Refill: 1  -     ALPRAZolam (XANAX) 0.5 MG tablet; Take 1 tablet (0.5 mg total) by mouth 3 (three) times daily as needed for Anxiety.  Dispense: 90 tablet; Refill: 2  -     doxycycline (VIBRAMYCIN) 100 MG Cap; Take 1 capsule (100 mg total) by mouth once daily.  Dispense: 20 capsule; Refill: 0  -     traZODone (DESYREL) 50 MG tablet; Take 1 tablet (50 mg total) by mouth every evening.  Dispense: 90 tablet; Refill: 1    Zoloft 100 mg 1-1/2 daily 135 with a refill.  Xanax 0.5 t.i.d.  maximum 90 with 2 refills.  CMP and lipids at [facility].  She will do these in [place].  Vibramycin 100 mg daily.  Ninety with a refill.  Trazodone 50 mg HS 90 with 1 refill.

## 2024-06-21 ENCOUNTER — PATIENT MESSAGE (OUTPATIENT)
Dept: ADMINISTRATIVE | Facility: HOSPITAL | Age: 69
End: 2024-06-21
Payer: MEDICARE

## 2024-06-21 ENCOUNTER — PATIENT MESSAGE (OUTPATIENT)
Dept: FAMILY MEDICINE | Facility: CLINIC | Age: 69
End: 2024-06-21
Payer: MEDICARE

## 2024-06-21 ENCOUNTER — PATIENT MESSAGE (OUTPATIENT)
Dept: ADMINISTRATIVE | Facility: OTHER | Age: 69
End: 2024-06-21
Payer: MEDICARE

## 2024-06-25 ENCOUNTER — PATIENT OUTREACH (OUTPATIENT)
Dept: ADMINISTRATIVE | Facility: HOSPITAL | Age: 69
End: 2024-06-25
Payer: MEDICARE

## 2024-06-25 DIAGNOSIS — Z78.0 POST-MENOPAUSAL: Primary | ICD-10-CM

## 2024-06-25 NOTE — PROGRESS NOTES
Population Health Chart Review & Patient Outreach Details      Additional [facility] Health Notes:               Updates Requested / Reviewed:      Updated Care Coordination Note, Care Everywhere, and          Health Maintenance Topics Overdue:      [facility] Score: 1     Osteoporosis Screening    RSV Vaccine                  Health Maintenance Topic(s) Outreach Outcomes & Actions Taken:    Osteoporosis Screening - Outreach Outcomes & Actions Taken  : Dexa Order Placed and Dexa Appointment Scheduled

## 2024-06-26 ENCOUNTER — PATIENT MESSAGE (OUTPATIENT)
Dept: FAMILY MEDICINE | Facility: CLINIC | Age: 69
End: 2024-06-26
Payer: MEDICARE

## 2024-06-26 RX ORDER — ATORVASTATIN CALCIUM 20 MG/1
20 TABLET, FILM COATED ORAL DAILY
Qty: 90 TABLET | Refills: 0 | Status: SHIPPED | OUTPATIENT
Start: 2024-06-26 | End: 2025-06-26

## 2024-06-27 NOTE — TELEPHONE ENCOUNTER
No care due was identified.  Health [facility] Embedded Care Due Messages. Reference number: [id number].   6/26/2024 8:16:05 PM CDT

## 2024-07-28 ENCOUNTER — PATIENT MESSAGE (OUTPATIENT)
Dept: FAMILY MEDICINE | Facility: CLINIC | Age: 69
End: 2024-07-28
Payer: MEDICARE

## 2024-08-11 NOTE — TELEPHONE ENCOUNTER
No care due was identified.  [facility] Embedded Care Due Messages. Reference number: [id number].   8/11/2024 2:47:17 PM CDT

## 2024-08-12 RX ORDER — ATORVASTATIN CALCIUM 20 MG/1
20 TABLET, FILM COATED ORAL DAILY
Qty: 90 TABLET | Refills: 2 | Status: SHIPPED | OUTPATIENT
Start: 2024-08-12

## 2024-08-12 NOTE — TELEPHONE ENCOUNTER
Marylou Bender  is requesting a refill authorization.  Brief Assessment and Rationale for Refill:  Approve     Medication Therapy Plan:         Comments:     Note composed:7:30 AM 08/12/2024

## 2024-08-17 ENCOUNTER — PATIENT MESSAGE (OUTPATIENT)
Dept: UROLOGY | Facility: CLINIC | Age: 69
End: 2024-08-17
Payer: MEDICARE

## 2024-09-04 ENCOUNTER — HOSPITAL ENCOUNTER (OUTPATIENT)
Dept: RADIOLOGY | Facility: HOSPITAL | Age: 69
Discharge: HOME | End: 2024-09-04
Attending: UROLOGY
Payer: MEDICARE

## 2024-09-04 DIAGNOSIS — N28.89 OTHER SPECIFIED DISORDERS OF KIDNEY AND URETER: ICD-10-CM

## 2024-09-04 DIAGNOSIS — C64.2 RENAL CELL CARCINOMA, LEFT: ICD-10-CM

## 2024-09-04 LAB
CREAT SERPL-MCNC: 1.6 MG/DL (ref 0.5–1.4)
SAMPLE: ABNORMAL

## 2024-09-04 PROCEDURE — 25500020 PHARM REV CODE 255

## 2024-09-04 PROCEDURE — 74183 MRI ABD W/O CNTR FLWD CNTR: CPT | Mod: 26,,, | Performed by: RADIOLOGY

## 2024-09-04 PROCEDURE — 71250 CT THORAX DX C-: CPT | Mod: 26,,, | Performed by: RADIOLOGY

## 2024-09-04 PROCEDURE — 71250 CT THORAX DX C-: CPT | Mod: TC

## 2024-09-04 PROCEDURE — 74183 MRI ABD W/O CNTR FLWD CNTR: CPT | Mod: TC

## 2024-09-04 PROCEDURE — A9585 GADOBUTROL INJECTION: HCPCS

## 2024-09-04 RX ORDER — GADOBUTROL 604.72 MG/ML
INJECTION INTRAVENOUS
Status: COMPLETED
Start: 2024-09-04 | End: 2024-09-04

## 2024-09-04 RX ADMIN — GADOBUTROL 5 ML: 604.72 INJECTION INTRAVENOUS at 10:09

## 2024-09-09 ENCOUNTER — OFFICE VISIT (OUTPATIENT)
Dept: UROLOGY | Facility: CLINIC | Age: 69
End: 2024-09-09
Payer: MEDICARE

## 2024-09-09 VITALS — BODY MASS INDEX: 37.67 KG/M2 | WEIGHT: 240 LBS | HEIGHT: 67 IN

## 2024-09-09 DIAGNOSIS — N18.31 STAGE 3A CHRONIC KIDNEY DISEASE: ICD-10-CM

## 2024-09-09 DIAGNOSIS — Z90.5 SOLITARY KIDNEY, ACQUIRED: ICD-10-CM

## 2024-09-09 DIAGNOSIS — C64.2 RENAL CELL CARCINOMA, LEFT: Primary | ICD-10-CM

## 2024-09-09 PROCEDURE — 99999 PR PBB SHADOW E&M-EST. PATIENT-LVL III: CPT | Mod: PBBFAC,,, | Performed by: UROLOGY

## 2024-09-09 PROCEDURE — 99213 OFFICE O/P EST LOW 20 MIN: CPT | Mod: PBBFAC,PO | Performed by: UROLOGY

## 2024-09-09 RX ORDER — DOXYCYCLINE HYCLATE 50 MG/1
1 CAPSULE, GELATIN COATED ORAL DAILY
COMMUNITY
Start: 2024-08-01

## 2024-09-09 NOTE — PROGRESS NOTES
[facility] Progress Note     Marylou Bender is a 69 y.o. female who presents for postop f/u s/p L radical nephrectomy     3d isolated GH and workup yielded negative lower tract save for some benign urethral polyps but CTU revealed >6cm lower pole left renal mass, solid enhancing and with irregular appearance concerning for RCC, extending centrally into collecting system and with extent into renal vein, confirmed on MRI without local invasion and with extent into early renal vein at hilum  Carried diagnosis of CKD3 with baseline eGFR 55 preop though no HTN/DM     Left robot-assisted laparoscopic radical nephrectomy on 1/24/24 - complex 2/2 medial fat, adhesions, tumor neovascularity - non adrenal sparing  PATH: pT3aN0/4MxR0 clear cell RCC 8cm (with incidental 2cm oncocytoma) - no necrosis, no rhabdoid/sarcomatoid, no LVI  Did well and DCed POD 1. Renal function with minimal decline from baseline, stabilized. 500cc ebl and dilutional downtrend of H/H stable at DC home    Last seen po fu 2/5/24 noting  Has overall done well. Regular bowel habits off bowel regimen. Though difficult to discern straining etc  Rash started a few d ago - diffuse truncal back front. Wasn't on abx, stopped all otcs and started benadryl, only taking tylenol not near incisions still present  Sensory left thigh decrease, improving. Urinating well. No fever chills sob  Energy still low, slowly improving. Burning tongue sensation she struggled with preop has resolved    Interim chart review:  5/14/24 Dr Grimm: Depression and anxiety needs refill of Xanax 1-3 per day depending on stress level. 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 Hyperlipidemia on her medication. Check is due. Rosacea also. Status post left nephrectomy for kidney cancer. Also needs refill of Zoloft. Needs Vibramycin for the rosacea.   7/17/24 Primary care Dr [facility] - Review records and discussed referral to urology/oncology [facility]  Patient agreeable to this recommendation and will facilitate getting tissue blocks and records  8/1/24 Patient referred to Dr. Macario Gagnon, hematology oncology [facility]. Patient to facilitate getting tissue blocks and records from [facility] for follow-up of the clear-cell carcinoma locally. Ordered DEXA scan at [facility] [place]. Incd supp Ca and VitD  8/28/24: Referral to oncology [facility] for after September 26 - The patient's appointments at [facility] this coming week and patient will have oncology and urology follow-up with imaging, MRI and CT. Plan to transfer oncology care to [facility] after September 26. Keep derm appt in [place] for chest lesions     Reference Range  12/09/23 10:34 01/09/24 10:31 01/24/24 13:10 01/25/24 04:17 02/02/24 12:21 03/26/24 09:01   Creatinine 0.5 - 1.4 1.0 1.1 1.3 1.4 1.4 1.4   eGFR >60 >60 55 ! 45 ! 41 ! 41 ! 40.7 !   7/18/24 Cr 1.48, eGFR 38, UA neg, VitD 40 (per care everywhere)  Cr 1.6 at time of MRI    She returns today noting  9/4/24 CT Chest: No findings to suggest progression of neoplastic disease. A few pulmonary nodules are unchanged (3 mm nodule in the right upper lobe series 4, image 184. 5 mm subpleural nodule along the fissure in the left lower lobe series 4, image 188)  9/4/24 MRI w/wo: The right kidney is of normal configuration and MR signal. No focal mass cyst or hydronephrosis is identified. The abdominal aorta and inferior vena cava are of normal caliber. No evidence of recurrent tumor or adenopathy is demonstrated.  The right kidney is within normal limits.  Still having some left anterior thigh numbness. Also has left back pain, present preop  Some discomfort lower portion of scar.  Mother had esrd.    Focused Physical Exam:    Body mass index is 37.59 kg/m². Weight: 108.9 kg (240 lb) Height: 5' 7" (170.2 cm)     Abdomen: Soft, non-tender, nondistended, no CVA tenderness  - midline extraction " site scar well healed no distinct hernia mild ttp inferior portion  (MRI images reviewed, fascia in tact, some defect in subQ tissue above fascia)      Recent Results (from the past 336 hour(s))   ISTAT CREATININE    Collection Time: 09/04/24  9:57 AM   Result Value Ref Range    POC Creatinine 1.6 (H) 0.5 - 1.4 mg/dL    Sample unknown        ASSESSMENT   1. Renal cell carcinoma, left        2. Solitary kidney, acquired        3. Stage 3a chronic kidney disease            Plan    Extensive review of all interim primary care management in [facility] as summarized above as well as labs.  Reviewed her interim surveillance imaging, all negative.    Overall doing well 8 months status post left radical nephrectomy.  Again reviewed pathology noting clear cell renal cell, T3 with extent into central renal sinus and early branches of renal vein consistent with preop imaging, resected with negative margins.  No other extent outside the kidney.  No poor prognostic features such as rhabdoid sarcomatoid, no necrosis, no lymphovascular invasion.  Negative lymph nodes.  Noted the incidental oncocytoma in the kidney is a benign mass, however as the dominant tumor was a clear cell renal cell carcinoma, now completely resected, no adjuvant treatment is needed.  We discussed surveillance.  We also reviewed that clear cell renal cell is most common subtype, and the last likely to be bilateral or recur in the contralateral kidney.      We reviewed surveillance for T3 RCC as Q six-month BMP with CT of the chest and abdomen with contrast for 3 years, then annually.  However, given her CKD, could also do CT non con of the chest and MRI of the abdomen with contrast as gadolinium is less nephrotoxic then iodinated contrast media.    Iniitial CT chest and MI abdomen negative, Would expect surveillance to remain so, but needs to continue at Q6 mos intervals in first 3 yrs then annually    She did have baseline CKD 3 in the absence of  hypertension and diabetes preop, with a baseline GFR of 55, so given age and  solitary kidney, her GFR decline is not unexpected, and had been stable. Followed closely by pcp in [place] and [place] and with no decline had not been referred to neprhology, but recent GFR in July in [place] had slight decline and discussed nephrology care in [place]. Would be appropriate.    Primary care and [place] also was trying to get her in with Oncology but they are short staffed there and she is set to establish medical oncology care with Dr. Foster tomorrow.  She was up-to-date on her surveillance which is negative, but also did note that though they were partially in this local area and split time with [place], that there local ties and family are moving to [place] and unlikely to follow up in the local area again, and certainly wherever they do reside should have at least either Urology or medical oncology, so that surveillance imaging can be ordered and execute per this plan, but unlikely to need both at this time especially from the oncology side there is no further treatment at this time except for surveillance.    At this time, greater than medical oncologic care, certainly nephrologic care would be appropriate given the above discussion.  Not urgent.  Should establish.  Again reviewed low-salt low-sodium diet less than 2000 mg per day avoid NSAIDs hydrate diet exercise weight loss, as well as good BP control    In regards to her incisional concerns, no distinct hernia palpable on exam though there is area of firmness where she was tender which is most likely just some scar tissue, and I did review axial images for MRI of which fascia appears intact without any hernia defect.  Certainly if there was concern that persisted in his area localized abdominal ultrasound targeting this area could be done in the future by any of her following providers.  As well, given her persistent left anterior thigh neuropathy, certainly if this  was positional related to surgery should be resolving or improving by this time but has it was not improved and she does have some baseline left-sided back pain which was present preoperatively, there is likely a musculoskeletal component, and perhaps an evaluation by back and spine in the future would be warranted.    Total time spent in/on encounter today, including face to face time with patient, counseling, medical record review, interpretation of tests/results, , and treatment plan coordination: 40 minutes    *Visit today included increased complexity associated with the current or anticipated ongoing medical longitudinal care and management related to this patient's serious and/or complex managed problem(s) as described above.

## 2024-09-20 ENCOUNTER — PATIENT MESSAGE (OUTPATIENT)
Dept: HEMATOLOGY/ONCOLOGY | Facility: CLINIC | Age: 69
End: 2024-09-20
Payer: MEDICARE

## 2024-09-20 ENCOUNTER — PATIENT OUTREACH (OUTPATIENT)
Dept: ADMINISTRATIVE | Facility: HOSPITAL | Age: 69
End: 2024-09-20
Payer: MEDICARE

## 2024-09-20 ENCOUNTER — PATIENT MESSAGE (OUTPATIENT)
Dept: ADMINISTRATIVE | Facility: HOSPITAL | Age: 69
End: 2024-09-20
Payer: MEDICARE

## 2024-09-20 NOTE — PROGRESS NOTES
Population Health Chart Review & Patient Outreach Details      Additional [facility] Health Notes:      CAMPAIGN- Preventative Care Screening  patient will help get records from [place]         Updates Requested / Reviewed:        Health Maintenance Topics Overdue:      VB Score: 1     Osteoporosis Screening    Influenza Vaccine  RSV Vaccine                  Health Maintenance Topic(s) Outreach Outcomes & Actions Taken:    Osteoporosis Screening - Outreach Outcomes & Actions Taken  : External Records Requested, Care Team Updated if Applicable

## 2024-11-16 RX ORDER — ATORVASTATIN CALCIUM 20 MG/1
20 TABLET, FILM COATED ORAL DAILY
Qty: 90 TABLET | Refills: 2 | Status: SHIPPED | OUTPATIENT
Start: 2024-11-16

## 2024-11-16 RX ORDER — TRAZODONE HYDROCHLORIDE 50 MG/1
50 TABLET ORAL NIGHTLY
Qty: 90 TABLET | Refills: 1 | Status: SHIPPED | OUTPATIENT
Start: 2024-11-16

## 2024-11-16 RX ORDER — SERTRALINE HYDROCHLORIDE 100 MG/1
TABLET, FILM COATED ORAL
Qty: 135 TABLET | Refills: 1 | Status: SHIPPED | OUTPATIENT
Start: 2024-11-16

## 2024-11-16 NOTE — TELEPHONE ENCOUNTER
No care due was identified.  [facility] Embedded Care Due Messages. Reference number: [id number].   11/16/2024 3:46:00 PM CST

## 2024-11-16 NOTE — TELEPHONE ENCOUNTER
No care due was identified.  Health [facility] Embedded Care Due Messages. Reference number: [id number].   11/16/2024 3:42:37 PM CST

## 2024-11-16 NOTE — TELEPHONE ENCOUNTER
No care due was identified.  [facility] Embedded Care Due Messages. Reference number: [id number].   11/16/2024 3:45:13 PM CST

## 2024-11-22 DIAGNOSIS — N18.30 CKD (CHRONIC KIDNEY DISEASE) STAGE 3, GFR 30-59 ML/MIN: ICD-10-CM

## 2024-12-17 ENCOUNTER — PATIENT MESSAGE (OUTPATIENT)
Dept: FAMILY MEDICINE | Facility: CLINIC | Age: 69
End: 2024-12-17
Payer: MEDICARE

## 2024-12-17 DIAGNOSIS — F41.9 ANXIETY: Primary | ICD-10-CM

## 2024-12-17 RX ORDER — TRAZODONE HYDROCHLORIDE 50 MG/1
50 TABLET ORAL NIGHTLY
Qty: 90 TABLET | Refills: 0 | Status: SHIPPED | OUTPATIENT
Start: 2024-12-17

## 2024-12-17 RX ORDER — SERTRALINE HYDROCHLORIDE 100 MG/1
TABLET, FILM COATED ORAL
Qty: 135 TABLET | Refills: 0 | Status: SHIPPED | OUTPATIENT
Start: 2024-12-17

## 2024-12-17 RX ORDER — ATORVASTATIN CALCIUM 20 MG/1
20 TABLET, FILM COATED ORAL DAILY
Qty: 90 TABLET | Refills: 0 | Status: SHIPPED | OUTPATIENT
Start: 2024-12-17

## 2024-12-17 RX ORDER — ALPRAZOLAM 0.5 MG/1
0.5 TABLET ORAL 3 TIMES DAILY PRN
Qty: 90 TABLET | Refills: 2 | OUTPATIENT
Start: 2024-12-17

## 2024-12-18 RX ORDER — ALPRAZOLAM 0.5 MG/1
0.5 TABLET ORAL NIGHTLY PRN
Qty: 90 TABLET | Refills: 0 | Status: SHIPPED | OUTPATIENT
Start: 2024-12-18

## 2024-12-18 NOTE — TELEPHONE ENCOUNTER
No care due was identified.  [facility] Embedded Care Due Messages. Reference number: [id number].   12/17/2024 7:42:29 PM CST

## 2024-12-18 NOTE — TELEPHONE ENCOUNTER
No care due was identified.  [facility] Embedded Care Due Messages. Reference number: [id number].   12/17/2024 7:40:23 PM CST

## 2024-12-18 NOTE — TELEPHONE ENCOUNTER
No care due was identified.  [facility] Embedded Care Due Messages. Reference number: [id number].   12/17/2024 7:41:27 PM CST

## 2024-12-18 NOTE — TELEPHONE ENCOUNTER
No care due was identified.  [facility] Embedded Care Due Messages. Reference number: [id number].   12/17/2024 7:39:15 PM CST

## 2024-12-19 ENCOUNTER — PATIENT MESSAGE (OUTPATIENT)
Dept: FAMILY MEDICINE | Facility: CLINIC | Age: 69
End: 2024-12-19
Payer: MEDICARE

## 2024-12-19 ENCOUNTER — TELEPHONE (OUTPATIENT)
Dept: FAMILY MEDICINE | Facility: CLINIC | Age: 69
End: 2024-12-19
Payer: MEDICARE

## 2024-12-19 RX ORDER — SERTRALINE HYDROCHLORIDE 100 MG/1
TABLET, FILM COATED ORAL
Qty: 135 TABLET | Refills: 0 | Status: CANCELLED | OUTPATIENT
Start: 2024-12-19

## 2024-12-19 RX ORDER — ATORVASTATIN CALCIUM 20 MG/1
20 TABLET, FILM COATED ORAL DAILY
Qty: 90 TABLET | Refills: 0 | Status: CANCELLED | OUTPATIENT
Start: 2024-12-19

## 2024-12-19 RX ORDER — TRAZODONE HYDROCHLORIDE 50 MG/1
50 TABLET ORAL NIGHTLY
Qty: 90 TABLET | Refills: 0 | Status: CANCELLED | OUTPATIENT
Start: 2024-12-19

## 2025-01-14 DIAGNOSIS — Z00.00 ENCOUNTER FOR MEDICARE ANNUAL WELLNESS EXAM: ICD-10-CM

## 2025-02-14 ENCOUNTER — PATIENT MESSAGE (OUTPATIENT)
Dept: UROLOGY | Facility: CLINIC | Age: 70
End: 2025-02-14
Payer: MEDICARE

## 2025-03-17 ENCOUNTER — PATIENT MESSAGE (OUTPATIENT)
Dept: FAMILY MEDICINE | Facility: CLINIC | Age: 70
End: 2025-03-17
Payer: MEDICARE

## (undated) DEVICE — GLOVE BIOGEL PI MICRO INDIC 7

## (undated) DEVICE — SET CYSTO IRR DRP CHMBR 84IN

## (undated) DEVICE — SOL POVIDONE PREP IODINE 4 OZ

## (undated) DEVICE — BAG INZII TISS RETRV 12/15MM

## (undated) DEVICE — SPONGE LAP 18X18 PREWASHED

## (undated) DEVICE — ECHELON FLEX POWERED VAS STPLR

## (undated) DEVICE — IRRIGATOR SUCTION W/TIP

## (undated) DEVICE — PAD PINK TRENDELENBURG POS XL

## (undated) DEVICE — SOL ELECTROLUBE ANTI-STIC

## (undated) DEVICE — SUT PDS II 0 CT-1 VIL MONO

## (undated) DEVICE — GLOVE SENSICARE PI ALOE 7

## (undated) DEVICE — DRESSING ABSRBNT ISLAND 3.6X8

## (undated) DEVICE — Device

## (undated) DEVICE — NDL PNEUMOPERITONEUM 150MM

## (undated) DEVICE — SEAL UNIVERSAL 5MM-8MM XI

## (undated) DEVICE — NDL SAFETY 21G X 1 1/2 ECLPSE

## (undated) DEVICE — SEALER VESSEL EXTEND

## (undated) DEVICE — STRAP OR TABLE 5IN X 72IN

## (undated) DEVICE — SCISSOR 5MMX35CM DIRECT DRIVE

## (undated) DEVICE — STAPLER ECHELON FLEX 35MM 32CM

## (undated) DEVICE — TRAY CATH FOL SIL 10ML 18FR

## (undated) DEVICE — TROCAR ENDO KII 5X150MM

## (undated) DEVICE — APPLICATOR ARISTA FLEX XL

## (undated) DEVICE — ELECTRODE REM PLYHSV RETURN 9

## (undated) DEVICE — SOL CLEARIFY VISUALIZATION LAP

## (undated) DEVICE — TROCAR ENDOPATH XCEL 12MM 15CM

## (undated) DEVICE — TAPE SILK 3IN

## (undated) DEVICE — SUT 2-0 VICRYL / SH (J417)

## (undated) DEVICE — PACK SIRUS BASIC V SURG STRL

## (undated) DEVICE — APPLICATOR CHLORAPREP ORN 26ML

## (undated) DEVICE — SYR SLIP TIP 10ML SHIELD

## (undated) DEVICE — CLIP HEMO-LOK MLX LARGE LF

## (undated) DEVICE — SLEEVE SCD EXPRESS KNEE MEDIUM

## (undated) DEVICE — TROCAR ENDOPATH XCEL 12X100MM

## (undated) DEVICE — GOWN POLY REINF BRTH SLV XL

## (undated) DEVICE — POWDER ARISTA AH 3G

## (undated) DEVICE — PACK CUSTOM UNIV BASIN SLI

## (undated) DEVICE — SOL NACL IRR 3000ML

## (undated) DEVICE — DRESSING TRANS 4X4 TEGADERM

## (undated) DEVICE — LEGGINGS X LARGE 6IN CUFF

## (undated) DEVICE — GLOVE SENSICARE PI ALOE 6.5

## (undated) DEVICE — COVER TIP CURVED SCISSORS XI

## (undated) DEVICE — HEMOSTAT SURGICEL 4X8IN

## (undated) DEVICE — SUT MONOCRYL 4-0 PS-2

## (undated) DEVICE — LOOP VESSEL YELLOW MAXI

## (undated) DEVICE — SET TUBE PNEUMOCLEAR SE HI FLO

## (undated) DEVICE — LINER SUCTION 3000CC

## (undated) DEVICE — DRAPE COLUMN DAVINCI XI

## (undated) DEVICE — DEVICE SNAPSECURE FOL CATH

## (undated) DEVICE — TOWEL OR DISP STRL BLUE 4/PK

## (undated) DEVICE — DRAPE MEDIUM SHEET 40X70IN

## (undated) DEVICE — DRAPE ARM DAVINCI XI

## (undated) DEVICE — ADHESIVE DERMABOND ADVANCED

## (undated) DEVICE — TOWEL OR XRAY WHITE 17X26IN

## (undated) DEVICE — SUT PROLENE 5-0 24 C-1 BL

## (undated) DEVICE — SYR 10CC LUER LOCK

## (undated) DEVICE — ENDOAPTH VAS RELOAD WHITE 2.5

## (undated) DEVICE — SPONGE GAUZE 4X4 12 PLY STRL

## (undated) DEVICE — BLADE SURG CARBON STEEL SZ11

## (undated) DEVICE — DRAPE ABDOMINAL TIBURON 14X11